# Patient Record
Sex: MALE | Race: WHITE | NOT HISPANIC OR LATINO | Employment: FULL TIME | RURAL
[De-identification: names, ages, dates, MRNs, and addresses within clinical notes are randomized per-mention and may not be internally consistent; named-entity substitution may affect disease eponyms.]

---

## 2017-06-15 ENCOUNTER — CONVERSION ENCOUNTER (OUTPATIENT)
Dept: FAMILY MEDICINE CLINIC | Facility: CLINIC | Age: 52
End: 2017-06-15

## 2017-06-16 LAB — TESTOST SERPL-MCNC: 369 NG/DL (ref 250–827)

## 2017-12-15 ENCOUNTER — CONVERSION ENCOUNTER (OUTPATIENT)
Dept: FAMILY MEDICINE CLINIC | Facility: CLINIC | Age: 52
End: 2017-12-15

## 2017-12-16 LAB — TESTOST SERPL-MCNC: 357 NG/DL (ref 250–827)

## 2019-02-12 ENCOUNTER — CONVERSION ENCOUNTER (OUTPATIENT)
Dept: FAMILY MEDICINE CLINIC | Facility: CLINIC | Age: 54
End: 2019-02-12

## 2019-02-15 LAB — 25(OH)D3 SERPL-MCNC: 41 NG/ML (ref 30–100)

## 2019-08-05 RX ORDER — SILDENAFIL CITRATE 20 MG/1
TABLET ORAL
Qty: 50 TABLET | OUTPATIENT
Start: 2019-08-05

## 2019-08-06 ENCOUNTER — CLINICAL SUPPORT (OUTPATIENT)
Dept: FAMILY MEDICINE CLINIC | Facility: CLINIC | Age: 54
End: 2019-08-06

## 2019-08-06 ENCOUNTER — TELEPHONE (OUTPATIENT)
Dept: FAMILY MEDICINE CLINIC | Facility: CLINIC | Age: 54
End: 2019-08-06

## 2019-08-06 DIAGNOSIS — R79.89 LOW TESTOSTERONE IN MALE: Primary | ICD-10-CM

## 2019-08-06 PROCEDURE — 96372 THER/PROPH/DIAG INJ SC/IM: CPT | Performed by: FAMILY MEDICINE

## 2019-08-06 RX ORDER — SILDENAFIL CITRATE 20 MG/1
TABLET ORAL
Qty: 50 TABLET | OUTPATIENT
Start: 2019-08-06

## 2019-08-06 RX ORDER — TESTOSTERONE CYPIONATE 200 MG/ML
200 INJECTION, SOLUTION INTRAMUSCULAR ONCE
Status: COMPLETED | OUTPATIENT
Start: 2019-08-06 | End: 2019-08-06

## 2019-08-06 RX ADMIN — TESTOSTERONE CYPIONATE 200 MG: 200 INJECTION, SOLUTION INTRAMUSCULAR at 17:06

## 2019-08-07 ENCOUNTER — TELEPHONE (OUTPATIENT)
Dept: FAMILY MEDICINE CLINIC | Facility: CLINIC | Age: 54
End: 2019-08-07

## 2019-08-07 RX ORDER — LISINOPRIL AND HYDROCHLOROTHIAZIDE 20; 12.5 MG/1; MG/1
1 TABLET ORAL DAILY
Refills: 4 | COMMUNITY
Start: 2019-07-06 | End: 2019-10-29

## 2019-08-07 RX ORDER — SILDENAFIL CITRATE 20 MG/1
20 TABLET ORAL DAILY
Qty: 50 TABLET | Refills: 3 | Status: SHIPPED | OUTPATIENT
Start: 2019-08-07 | End: 2019-08-07 | Stop reason: SDUPTHER

## 2019-08-07 RX ORDER — CHLORHEXIDINE GLUCONATE 0.12 MG/ML
RINSE ORAL
Refills: 1 | COMMUNITY
Start: 2019-07-06 | End: 2020-05-14

## 2019-08-07 RX ORDER — ANASTROZOLE 1 MG/1
TABLET ORAL
Refills: 3 | COMMUNITY
Start: 2019-07-06 | End: 2019-10-29

## 2019-08-08 RX ORDER — SILDENAFIL CITRATE 20 MG/1
TABLET ORAL
Qty: 50 TABLET | Refills: 3 | Status: SHIPPED | OUTPATIENT
Start: 2019-08-08 | End: 2019-10-29 | Stop reason: SDUPTHER

## 2019-10-29 ENCOUNTER — OFFICE VISIT (OUTPATIENT)
Dept: FAMILY MEDICINE CLINIC | Facility: CLINIC | Age: 54
End: 2019-10-29

## 2019-10-29 VITALS
BODY MASS INDEX: 31.22 KG/M2 | HEART RATE: 60 BPM | OXYGEN SATURATION: 98 % | WEIGHT: 223 LBS | TEMPERATURE: 97.8 F | DIASTOLIC BLOOD PRESSURE: 93 MMHG | RESPIRATION RATE: 16 BRPM | SYSTOLIC BLOOD PRESSURE: 151 MMHG | HEIGHT: 71 IN

## 2019-10-29 DIAGNOSIS — Z23 FLU VACCINE NEED: ICD-10-CM

## 2019-10-29 DIAGNOSIS — E29.1 HYPOTESTOSTERONEMIA IN MALE: ICD-10-CM

## 2019-10-29 DIAGNOSIS — Z12.5 SCREENING FOR PROSTATE CANCER: ICD-10-CM

## 2019-10-29 DIAGNOSIS — Z00.00 ENCOUNTER FOR WELLNESS EXAMINATION: Primary | ICD-10-CM

## 2019-10-29 DIAGNOSIS — I10 ESSENTIAL HYPERTENSION: ICD-10-CM

## 2019-10-29 PROCEDURE — 80061 LIPID PANEL: CPT | Performed by: FAMILY MEDICINE

## 2019-10-29 PROCEDURE — G0103 PSA SCREENING: HCPCS | Performed by: FAMILY MEDICINE

## 2019-10-29 PROCEDURE — 36415 COLL VENOUS BLD VENIPUNCTURE: CPT | Performed by: FAMILY MEDICINE

## 2019-10-29 PROCEDURE — 84402 ASSAY OF FREE TESTOSTERONE: CPT | Performed by: FAMILY MEDICINE

## 2019-10-29 PROCEDURE — 99386 PREV VISIT NEW AGE 40-64: CPT | Performed by: FAMILY MEDICINE

## 2019-10-29 PROCEDURE — 96372 THER/PROPH/DIAG INJ SC/IM: CPT | Performed by: FAMILY MEDICINE

## 2019-10-29 PROCEDURE — 99213 OFFICE O/P EST LOW 20 MIN: CPT | Performed by: FAMILY MEDICINE

## 2019-10-29 PROCEDURE — 90674 CCIIV4 VAC NO PRSV 0.5 ML IM: CPT | Performed by: FAMILY MEDICINE

## 2019-10-29 PROCEDURE — 80053 COMPREHEN METABOLIC PANEL: CPT | Performed by: FAMILY MEDICINE

## 2019-10-29 PROCEDURE — 90471 IMMUNIZATION ADMIN: CPT | Performed by: FAMILY MEDICINE

## 2019-10-29 PROCEDURE — 84403 ASSAY OF TOTAL TESTOSTERONE: CPT | Performed by: FAMILY MEDICINE

## 2019-10-29 RX ORDER — SILDENAFIL 100 MG/1
100 TABLET, FILM COATED ORAL DAILY PRN
Qty: 30 TABLET | Refills: 1 | Status: SHIPPED | OUTPATIENT
Start: 2019-10-29 | End: 2020-01-21

## 2019-10-29 RX ORDER — AMLODIPINE BESYLATE 5 MG/1
5 TABLET ORAL DAILY
Qty: 30 TABLET | Refills: 1 | Status: SHIPPED | OUTPATIENT
Start: 2019-10-29 | End: 2019-12-16 | Stop reason: SDUPTHER

## 2019-10-29 RX ORDER — TESTOSTERONE CYPIONATE 200 MG/ML
200 INJECTION, SOLUTION INTRAMUSCULAR ONCE
Status: COMPLETED | OUTPATIENT
Start: 2019-10-29 | End: 2019-10-29

## 2019-10-29 RX ORDER — SYRINGE W-NEEDLE,DISPOSAB,3 ML 25GX5/8"
1 SYRINGE, EMPTY DISPOSABLE MISCELLANEOUS
Qty: 24 EACH | Refills: 0 | Status: SHIPPED | OUTPATIENT
Start: 2019-10-29 | End: 2019-11-23 | Stop reason: SDUPTHER

## 2019-10-29 RX ORDER — TESTOSTERONE CYPIONATE 200 MG/ML
200 INJECTION, SOLUTION INTRAMUSCULAR
Qty: 6 ML | Refills: 1 | Status: SHIPPED | OUTPATIENT
Start: 2019-10-29 | End: 2019-10-29 | Stop reason: SDUPTHER

## 2019-10-29 RX ORDER — TESTOSTERONE CYPIONATE 200 MG/ML
200 INJECTION, SOLUTION INTRAMUSCULAR
COMMUNITY
End: 2019-10-29 | Stop reason: SDUPTHER

## 2019-10-29 RX ADMIN — TESTOSTERONE CYPIONATE 200 MG: 200 INJECTION, SOLUTION INTRAMUSCULAR at 11:50

## 2019-10-30 LAB
ALBUMIN SERPL-MCNC: 4.8 G/DL (ref 3.5–5.2)
ALBUMIN/GLOB SERPL: 1.5 G/DL
ALP SERPL-CCNC: 87 U/L (ref 39–117)
ALT SERPL W P-5'-P-CCNC: 44 U/L (ref 1–41)
ANION GAP SERPL CALCULATED.3IONS-SCNC: 12.1 MMOL/L (ref 5–15)
AST SERPL-CCNC: 29 U/L (ref 1–40)
BILIRUB SERPL-MCNC: 0.4 MG/DL (ref 0.2–1.2)
BUN BLD-MCNC: 16 MG/DL (ref 6–20)
BUN/CREAT SERPL: 15.4 (ref 7–25)
CALCIUM SPEC-SCNC: 9.3 MG/DL (ref 8.6–10.5)
CHLORIDE SERPL-SCNC: 95 MMOL/L (ref 98–107)
CHOLEST SERPL-MCNC: 198 MG/DL (ref 0–200)
CO2 SERPL-SCNC: 28.9 MMOL/L (ref 22–29)
CREAT BLD-MCNC: 1.04 MG/DL (ref 0.76–1.27)
GFR SERPL CREATININE-BSD FRML MDRD: 74 ML/MIN/1.73
GLOBULIN UR ELPH-MCNC: 3.1 GM/DL
GLUCOSE BLD-MCNC: 129 MG/DL (ref 65–99)
HDLC SERPL-MCNC: 45 MG/DL (ref 40–60)
LDLC SERPL CALC-MCNC: 137 MG/DL (ref 0–100)
LDLC/HDLC SERPL: 3.04 {RATIO}
POTASSIUM BLD-SCNC: 4 MMOL/L (ref 3.5–5.2)
PROT SERPL-MCNC: 7.9 G/DL (ref 6–8.5)
PSA SERPL-MCNC: 0.89 NG/ML (ref 0–4)
SODIUM BLD-SCNC: 136 MMOL/L (ref 136–145)
TRIGL SERPL-MCNC: 80 MG/DL (ref 0–150)
VLDLC SERPL-MCNC: 16 MG/DL (ref 5–40)

## 2019-11-02 LAB
TESTOST FREE SERPL-MCNC: 8.6 PG/ML (ref 7.2–24)
TESTOST SERPL-MCNC: 555.1 NG/DL (ref 264–916)

## 2019-11-04 DIAGNOSIS — R73.09 ELEVATED GLUCOSE: Primary | ICD-10-CM

## 2019-11-21 RX ORDER — TESTOSTERONE CYPIONATE 200 MG/ML
200 INJECTION, SOLUTION INTRAMUSCULAR
Start: 2019-11-21 | End: 2020-06-01

## 2019-11-25 RX ORDER — SYRINGE WITH NEEDLE, 1 ML 25GX5/8"
SYRINGE, EMPTY DISPOSABLE MISCELLANEOUS
Qty: 20 EACH | Refills: 0 | Status: SHIPPED | OUTPATIENT
Start: 2019-11-25 | End: 2021-12-17 | Stop reason: SDUPTHER

## 2019-11-26 ENCOUNTER — CLINICAL SUPPORT (OUTPATIENT)
Dept: FAMILY MEDICINE CLINIC | Facility: CLINIC | Age: 54
End: 2019-11-26

## 2019-11-26 DIAGNOSIS — R73.09 ELEVATED GLUCOSE: ICD-10-CM

## 2019-11-26 LAB — HBA1C MFR BLD: 5.7 %

## 2019-11-26 PROCEDURE — 83036 HEMOGLOBIN GLYCOSYLATED A1C: CPT | Performed by: FAMILY MEDICINE

## 2019-12-16 RX ORDER — AMLODIPINE BESYLATE 5 MG/1
TABLET ORAL
Qty: 30 TABLET | Refills: 0 | Status: SHIPPED | OUTPATIENT
Start: 2019-12-16 | End: 2020-01-15

## 2020-01-15 RX ORDER — AMLODIPINE BESYLATE 5 MG/1
TABLET ORAL
Qty: 90 TABLET | Refills: 0 | Status: SHIPPED | OUTPATIENT
Start: 2020-01-15 | End: 2020-04-02 | Stop reason: SDUPTHER

## 2020-01-15 NOTE — TELEPHONE ENCOUNTER
Last visit: 10/29/19  Next visit: none  Last labs: 10/29/19    Rx requested: amLODIPine  Pharmacy: [facility] in [place]

## 2020-01-20 NOTE — TELEPHONE ENCOUNTER
Last visit:  10/29/19  Next visit:none   Last labs:  11/26/19     Rx requested: sildenafil   Pharmacy: [facility] [place]

## 2020-01-21 RX ORDER — SILDENAFIL 100 MG/1
TABLET, FILM COATED ORAL
Qty: 30 TABLET | Refills: 1 | Status: SHIPPED | OUTPATIENT
Start: 2020-01-21 | End: 2021-02-22 | Stop reason: SDUPTHER

## 2020-03-20 ENCOUNTER — PATIENT MESSAGE (OUTPATIENT)
Dept: FAMILY MEDICINE CLINIC | Facility: CLINIC | Age: 55
End: 2020-03-20

## 2020-03-20 ENCOUNTER — TELEPHONE (OUTPATIENT)
Dept: FAMILY MEDICINE CLINIC | Facility: CLINIC | Age: 55
End: 2020-03-20

## 2020-03-20 NOTE — TELEPHONE ENCOUNTER
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 Please let me know if you need anything.     I have a [employer] license renewal physical coming due in May with our company nurse. As part of my application for renewal and physical form, I'm required to get a letter from my Doctor on they're company letterhead listing any medications and the verbiage listed saying it's working and won't effect job.     You can mail or I can swing by to pick it up anytime in the next few weeks. No hurry.     Thanks so much,   Jalil Khoury

## 2020-03-20 NOTE — TELEPHONE ENCOUNTER
Clarify meds and doses first w/ pt  He needs a letter from me saying that the meds/ doses he takes will not affect his job performance???   Monitoring Blood Pressure At Home  1. Rest for 5-15 minutes before taking blood pressure  2. Record 3 blood pressures, each 1-5 minutes apart  3. Average the 3 readings

## 2020-04-02 RX ORDER — AMLODIPINE BESYLATE 5 MG/1
7.5 TABLET ORAL DAILY
Qty: 135 TABLET | Refills: 0 | Status: SHIPPED | OUTPATIENT
Start: 2020-04-02 | End: 2020-04-10

## 2020-04-10 RX ORDER — AMLODIPINE BESYLATE 5 MG/1
TABLET ORAL
Qty: 30 TABLET | Refills: 0 | Status: SHIPPED | OUTPATIENT
Start: 2020-04-10 | End: 2020-05-05

## 2020-05-14 ENCOUNTER — OFFICE VISIT (OUTPATIENT)
Dept: FAMILY MEDICINE CLINIC | Facility: CLINIC | Age: 55
End: 2020-05-14

## 2020-05-14 VITALS
HEART RATE: 66 BPM | TEMPERATURE: 97.8 F | BODY MASS INDEX: 30.66 KG/M2 | DIASTOLIC BLOOD PRESSURE: 96 MMHG | SYSTOLIC BLOOD PRESSURE: 151 MMHG | OXYGEN SATURATION: 99 % | WEIGHT: 219 LBS | RESPIRATION RATE: 14 BRPM | HEIGHT: 71 IN

## 2020-05-14 DIAGNOSIS — E29.1 HYPOTESTOSTERONEMIA IN MALE: ICD-10-CM

## 2020-05-14 DIAGNOSIS — I10 UNCONTROLLED HYPERTENSION: Primary | ICD-10-CM

## 2020-05-14 PROBLEM — N52.9 ERECTILE DYSFUNCTION: Status: ACTIVE | Noted: 2020-05-14

## 2020-05-14 PROCEDURE — 84403 ASSAY OF TOTAL TESTOSTERONE: CPT | Performed by: FAMILY MEDICINE

## 2020-05-14 PROCEDURE — 84402 ASSAY OF FREE TESTOSTERONE: CPT | Performed by: FAMILY MEDICINE

## 2020-05-14 PROCEDURE — 36415 COLL VENOUS BLD VENIPUNCTURE: CPT | Performed by: FAMILY MEDICINE

## 2020-05-14 PROCEDURE — 99213 OFFICE O/P EST LOW 20 MIN: CPT | Performed by: FAMILY MEDICINE

## 2020-05-14 RX ORDER — AMLODIPINE BESYLATE 10 MG/1
10 TABLET ORAL DAILY
Qty: 90 TABLET | Refills: 0 | Status: SHIPPED | OUTPATIENT
Start: 2020-05-14 | End: 2020-05-14

## 2020-05-14 RX ORDER — AMLODIPINE BESYLATE 10 MG/1
10 TABLET ORAL DAILY
Qty: 90 TABLET | Refills: 0 | Status: SHIPPED | OUTPATIENT
Start: 2020-05-14 | End: 2020-09-02 | Stop reason: SDUPTHER

## 2020-05-14 RX ORDER — AMLODIPINE BESYLATE 5 MG/1
1.5 TABLET ORAL DAILY
COMMUNITY
End: 2020-05-14

## 2020-05-14 NOTE — PROGRESS NOTES
"Subjective   Turner Khoury is a 54 y.o. male.     Chief Complaint   Patient presents with   • Hypertension   • Hypogonadism         Current Outpatient Medications:   •  amLODIPine (NORVASC) 10 MG tablet, Take 1 tablet by mouth Daily., Disp: 90 tablet, Rfl: 0  •  B-D 3CC LUER-TONI SYR 23GX1\" 23G X 1\" 3 ML misc, USE AS DIRECTED EVERY 14 (FOURTEEN) DAYS, Disp: 20 each, Rfl: 0  •  sildenafil (VIAGRA) 100 MG tablet, TAKE ONE TABLET BY MOUTH DAILY AS NEEDED FOR ERECTILE DYSFUNCTION AS DIRECTED, Disp: 30 tablet, Rfl: 1  •  Testosterone Cypionate (DEPO-TESTOSTERONE) 200 MG/ML injection, Inject 1 mL into the appropriate muscle as directed by prescriber Every 14 (Fourteen) Days., Disp: , Rfl:     Past Medical History:   Diagnosis Date   • Colon polyp    • Erectile dysfunction    • Hypertension    • Hypogonadism in male        Past Surgical History:   Procedure Laterality Date   • COLONOSCOPY         Family History   Problem Relation Age of Onset   • Alcohol abuse Mother    • Hypertension Mother    • Stroke Mother    • Cancer Father         Lung Cancer       Social History     Socioeconomic History   • Marital status: Single     Spouse name: Not on file   • Number of children: Not on file   • Years of education: Not on file   • Highest education level: Not on file   Tobacco Use   • Smoking status: Former Smoker     Packs/day: 2.00     Years: 10.00     Pack years: 20.00     Types: Cigarettes     Last attempt to quit:      Years since quittin.3   • Smokeless tobacco: Never Used   Substance and Sexual Activity   • Alcohol use: Yes     Comment: occ   • Drug use: No   • Sexual activity: Yes     Partners: Female     Birth control/protection: Condom       53 y/o C male here for f/u on HTN/ Hypotestosteronism    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    Review of Systems   Constitutional: Negative for activity change, appetite change, fatigue, unexpected weight gain and unexpected weight loss.   Respiratory: Negative for cough, chest tightness and shortness of breath.    Cardiovascular: Negative for chest pain, palpitations and leg swelling.   Genitourinary: Negative for decreased libido, frequency, erectile dysfunction, urgency and urinary incontinence.   Musculoskeletal: Negative for arthralgias and myalgias.   Neurological: Negative for dizziness, facial asymmetry, speech difficulty, weakness, light-headedness, headache, memory problem and confusion.       Vitals:    05/14/20 0923   BP: 151/96   Pulse: 66   Resp: 14   Temp: 97.8 °F (36.6 °C)   SpO2: 99%       Objective   Physical Exam   Constitutional: He is oriented to person, place, and time. He appears well-developed and well-nourished.   HENT:   Head: Normocephalic and atraumatic.   Neck: Normal range of motion. Neck supple. Normal carotid pulses present. Carotid bruit is not present.   Cardiovascular: Normal rate, regular rhythm, normal heart sounds and intact distal pulses.   No murmur heard.  Pulmonary/Chest: Effort normal and breath sounds normal.   Musculoskeletal: He exhibits no edema.   Neurological: He is alert and oriented to person, place, and time. No cranial nerve deficit.   Skin: Skin is warm and dry. No rash noted.   Psychiatric: He has a normal mood and affect. His behavior is normal. Judgment and thought content normal.   Nursing note and vitals reviewed.        Assessment/Plan   Turner was seen today for hypertension and hypogonadism.    Diagnoses and all orders for this visit:    Uncontrolled hypertension    Hypotestosteronemia in male  -     Testosterone (Free & Total), LC / MS; Future  -      Testosterone (Free & Total), LC / MS    Other orders  -     Discontinue: amLODIPine (NORVASC) 10 MG tablet; Take 1 tablet by mouth Daily.  -     amLODIPine (NORVASC) 10 MG tablet; Take 1 tablet by mouth Daily.    Pt to cont to monitor o/s BP's and call if >140/90

## 2020-05-17 LAB
TESTOST FREE SERPL-MCNC: 18 PG/ML (ref 7.2–24)
TESTOST SERPL-MCNC: 966.6 NG/DL (ref 264–916)

## 2020-05-18 ENCOUNTER — PATIENT MESSAGE (OUTPATIENT)
Dept: FAMILY MEDICINE CLINIC | Facility: CLINIC | Age: 55
End: 2020-05-18

## 2020-05-30 DIAGNOSIS — E29.1 HYPOTESTOSTERONEMIA IN MALE: Primary | ICD-10-CM

## 2020-06-01 RX ORDER — TESTOSTERONE CYPIONATE 200 MG/ML
INJECTION, SOLUTION INTRAMUSCULAR
Qty: 2 ML | Refills: 2 | Status: SHIPPED | OUTPATIENT
Start: 2020-06-01 | End: 2020-09-04

## 2020-06-01 NOTE — TELEPHONE ENCOUNTER
Last visit:  5/14/20  Next visit: none  Last labs: 5/14/20    Rx requested: Testosterone   Pharmacy: [facility] in [place]

## 2020-08-31 DIAGNOSIS — Z12.5 SCREENING FOR PROSTATE CANCER: ICD-10-CM

## 2020-08-31 DIAGNOSIS — Z13.220 ENCOUNTER FOR SCREENING FOR LIPID DISORDER: ICD-10-CM

## 2020-08-31 DIAGNOSIS — E29.1 HYPOTESTOSTERONEMIA IN MALE: ICD-10-CM

## 2020-08-31 DIAGNOSIS — I10 UNCONTROLLED HYPERTENSION: ICD-10-CM

## 2020-08-31 DIAGNOSIS — E29.1 HYPOTESTOSTERONEMIA IN MALE: Primary | ICD-10-CM

## 2020-08-31 RX ORDER — TESTOSTERONE CYPIONATE 200 MG/ML
INJECTION, SOLUTION INTRAMUSCULAR
Refills: 1 | OUTPATIENT
Start: 2020-08-31

## 2020-08-31 NOTE — TELEPHONE ENCOUNTER
Last visit:  5/14/20  Next visit: none  Last labs:5/14/20    Rx requested: Testosterone   Pharmacy: [facility] in [place]

## 2020-09-02 ENCOUNTER — CLINICAL SUPPORT (OUTPATIENT)
Dept: FAMILY MEDICINE CLINIC | Facility: CLINIC | Age: 55
End: 2020-09-02

## 2020-09-02 VITALS — DIASTOLIC BLOOD PRESSURE: 84 MMHG | HEART RATE: 77 BPM | SYSTOLIC BLOOD PRESSURE: 128 MMHG

## 2020-09-02 DIAGNOSIS — I10 UNCONTROLLED HYPERTENSION: ICD-10-CM

## 2020-09-02 DIAGNOSIS — E29.1 HYPOTESTOSTERONEMIA IN MALE: ICD-10-CM

## 2020-09-02 DIAGNOSIS — Z13.220 ENCOUNTER FOR SCREENING FOR LIPID DISORDER: ICD-10-CM

## 2020-09-02 DIAGNOSIS — N52.9 ERECTILE DYSFUNCTION, UNSPECIFIED ERECTILE DYSFUNCTION TYPE: ICD-10-CM

## 2020-09-02 PROCEDURE — 36415 COLL VENOUS BLD VENIPUNCTURE: CPT | Performed by: FAMILY MEDICINE

## 2020-09-02 PROCEDURE — 80053 COMPREHEN METABOLIC PANEL: CPT | Performed by: FAMILY MEDICINE

## 2020-09-02 PROCEDURE — 80061 LIPID PANEL: CPT | Performed by: FAMILY MEDICINE

## 2020-09-02 PROCEDURE — 84402 ASSAY OF FREE TESTOSTERONE: CPT | Performed by: FAMILY MEDICINE

## 2020-09-02 PROCEDURE — 84403 ASSAY OF TOTAL TESTOSTERONE: CPT | Performed by: FAMILY MEDICINE

## 2020-09-02 RX ORDER — AMLODIPINE BESYLATE 10 MG/1
10 TABLET ORAL DAILY
Qty: 90 TABLET | Refills: 0 | Status: SHIPPED | OUTPATIENT
Start: 2020-09-02 | End: 2020-10-29 | Stop reason: SDUPTHER

## 2020-09-03 LAB
ALBUMIN SERPL-MCNC: 4.9 G/DL (ref 3.5–5.2)
ALBUMIN/GLOB SERPL: 1.8 G/DL
ALP SERPL-CCNC: 89 U/L (ref 39–117)
ALT SERPL W P-5'-P-CCNC: 31 U/L (ref 1–41)
ANION GAP SERPL CALCULATED.3IONS-SCNC: 13.3 MMOL/L (ref 5–15)
AST SERPL-CCNC: 20 U/L (ref 1–40)
BILIRUB SERPL-MCNC: 0.5 MG/DL (ref 0–1.2)
BUN SERPL-MCNC: 12 MG/DL (ref 6–20)
BUN/CREAT SERPL: 10.6 (ref 7–25)
CALCIUM SPEC-SCNC: 9.9 MG/DL (ref 8.6–10.5)
CHLORIDE SERPL-SCNC: 99 MMOL/L (ref 98–107)
CHOLEST SERPL-MCNC: 218 MG/DL (ref 0–200)
CO2 SERPL-SCNC: 29.7 MMOL/L (ref 22–29)
CREAT SERPL-MCNC: 1.13 MG/DL (ref 0.76–1.27)
GFR SERPL CREATININE-BSD FRML MDRD: 68 ML/MIN/1.73
GLOBULIN UR ELPH-MCNC: 2.8 GM/DL
GLUCOSE SERPL-MCNC: 114 MG/DL (ref 65–99)
HDLC SERPL-MCNC: 55 MG/DL (ref 40–60)
LDLC SERPL CALC-MCNC: 148 MG/DL (ref 0–100)
LDLC/HDLC SERPL: 2.7 {RATIO}
POTASSIUM SERPL-SCNC: 3.9 MMOL/L (ref 3.5–5.2)
PROT SERPL-MCNC: 7.7 G/DL (ref 6–8.5)
SODIUM SERPL-SCNC: 142 MMOL/L (ref 136–145)
TRIGL SERPL-MCNC: 73 MG/DL (ref 0–150)
VLDLC SERPL-MCNC: 14.6 MG/DL (ref 5–40)

## 2020-09-04 RX ORDER — TESTOSTERONE CYPIONATE 200 MG/ML
INJECTION, SOLUTION INTRAMUSCULAR
Qty: 2 ML | Refills: 0 | Status: SHIPPED | OUTPATIENT
Start: 2020-09-04 | End: 2020-09-18

## 2020-09-05 DIAGNOSIS — E29.1 HYPOTESTOSTERONEMIA IN MALE: ICD-10-CM

## 2020-09-05 LAB
TESTOST FREE SERPL-MCNC: 21.2 PG/ML (ref 7.2–24)
TESTOST SERPL-MCNC: 1476.7 NG/DL (ref 264–916)

## 2020-09-18 DIAGNOSIS — E29.1 HYPOTESTOSTERONEMIA IN MALE: ICD-10-CM

## 2020-09-18 RX ORDER — TESTOSTERONE CYPIONATE 200 MG/ML
INJECTION, SOLUTION INTRAMUSCULAR
Qty: 2 ML | Refills: 0 | Status: SHIPPED | OUTPATIENT
Start: 2020-09-18 | End: 2021-04-16

## 2020-09-18 NOTE — TELEPHONE ENCOUNTER
Last visit:  5/14/20  Next visit:none  Last labs:9/2/20    Rx requested: Testosterone   Pharmacy: [facility] in [place]

## 2020-10-29 ENCOUNTER — OFFICE VISIT (OUTPATIENT)
Dept: FAMILY MEDICINE CLINIC | Facility: CLINIC | Age: 55
End: 2020-10-29

## 2020-10-29 VITALS
BODY MASS INDEX: 30.94 KG/M2 | HEART RATE: 69 BPM | DIASTOLIC BLOOD PRESSURE: 90 MMHG | TEMPERATURE: 97.5 F | RESPIRATION RATE: 14 BRPM | WEIGHT: 221 LBS | SYSTOLIC BLOOD PRESSURE: 143 MMHG | OXYGEN SATURATION: 100 % | HEIGHT: 71 IN

## 2020-10-29 DIAGNOSIS — R03.0 ELEVATED BLOOD-PRESSURE READING, WITHOUT DIAGNOSIS OF HYPERTENSION: ICD-10-CM

## 2020-10-29 DIAGNOSIS — E78.2 MIXED HYPERLIPIDEMIA: ICD-10-CM

## 2020-10-29 DIAGNOSIS — R19.8 RUQ FULLNESS: ICD-10-CM

## 2020-10-29 DIAGNOSIS — Z00.00 ANNUAL PHYSICAL EXAM: Primary | ICD-10-CM

## 2020-10-29 DIAGNOSIS — Z12.5 SCREENING FOR PROSTATE CANCER: ICD-10-CM

## 2020-10-29 DIAGNOSIS — R73.09 ELEVATED GLUCOSE: ICD-10-CM

## 2020-10-29 PROBLEM — I10 HYPERTENSION: Status: RESOLVED | Noted: 2020-05-14 | Resolved: 2020-10-29

## 2020-10-29 LAB
BILIRUB BLD-MCNC: NEGATIVE MG/DL
CLARITY, POC: CLEAR
COLOR UR: YELLOW
GLUCOSE UR STRIP-MCNC: NEGATIVE MG/DL
HBA1C MFR BLD: 5.8 % (ref 3.5–5.6)
KETONES UR QL: NEGATIVE
LEUKOCYTE EST, POC: NEGATIVE
NITRITE UR-MCNC: NEGATIVE MG/ML
PH UR: 7 [PH] (ref 5–8)
PROT UR STRIP-MCNC: NEGATIVE MG/DL
RBC # UR STRIP: NEGATIVE /UL
SP GR UR: 1.01 (ref 1–1.03)
UROBILINOGEN UR QL: NORMAL

## 2020-10-29 PROCEDURE — 99396 PREV VISIT EST AGE 40-64: CPT | Performed by: FAMILY MEDICINE

## 2020-10-29 PROCEDURE — 83036 HEMOGLOBIN GLYCOSYLATED A1C: CPT | Performed by: FAMILY MEDICINE

## 2020-10-29 PROCEDURE — 36415 COLL VENOUS BLD VENIPUNCTURE: CPT | Performed by: FAMILY MEDICINE

## 2020-10-29 PROCEDURE — 81003 URINALYSIS AUTO W/O SCOPE: CPT | Performed by: FAMILY MEDICINE

## 2020-10-29 PROCEDURE — G0103 PSA SCREENING: HCPCS | Performed by: FAMILY MEDICINE

## 2020-10-29 RX ORDER — OMEGA-3 FATTY ACIDS CAP DELAYED RELEASE 1000 MG 1000 MG
1 CAPSULE DELAYED RELEASE ORAL DAILY
Start: 2020-10-29 | End: 2020-12-16

## 2020-10-29 RX ORDER — MULTIVIT-MIN/IRON/FOLIC ACID/K 18-600-40
2000 CAPSULE ORAL DAILY
Qty: 30 CAPSULE
Start: 2020-10-29 | End: 2020-12-16

## 2020-10-29 RX ORDER — PEPSIN
1 POWDER (GRAM) MISCELLANEOUS DAILY
Start: 2020-10-29 | End: 2021-12-17

## 2020-10-29 RX ORDER — LANOLIN ALCOHOL/MO/W.PET/CERES
1000 CREAM (GRAM) TOPICAL DAILY
Start: 2020-10-29 | End: 2020-12-16

## 2020-10-29 RX ORDER — AMLODIPINE BESYLATE 10 MG/1
10 TABLET ORAL DAILY
Qty: 90 TABLET | Refills: 1 | Status: SHIPPED | OUTPATIENT
Start: 2020-10-29 | End: 2020-12-16

## 2020-10-29 NOTE — PROGRESS NOTES
"[place]   Turner Khoury is a 55 y.o. male.     Chief Complaint   Patient presents with   • Annual Exam         Current Outpatient Medications:   •  amLODIPine (NORVASC) 10 MG tablet, Take 1 tablet by mouth Daily., Disp: 90 tablet, Rfl: 1  •  B-D 3CC LUER-TONI SYR 23GX1\" 23G X 1\" 3 ML misc, USE AS DIRECTED EVERY 14 (FOURTEEN) DAYS, Disp: 20 each, Rfl: 0  •  sildenafil (VIAGRA) 100 MG tablet, TAKE ONE TABLET BY MOUTH DAILY AS NEEDED FOR ERECTILE DYSFUNCTION AS DIRECTED, Disp: 30 tablet, Rfl: 1  •  Testosterone Cypionate (DEPOTESTOTERONE CYPIONATE) 200 MG/ML injection, INJECT 1 ML INTO THE APPROPRIATE MUSCLE AS DIRECTED BY PRESCRIBER EVERY 14 DAYS, Disp: 2 mL, Rfl: 0  •  Cholecalciferol (Vitamin D) 50 MCG (2000 UT) capsule, Take 1 capsule by mouth Daily., Disp: 30 capsule, Rfl:   •  Flaxseed, Linseed, (Flaxseed Oil) oil, Take 1 teaspoon(s) by mouth Daily., Disp: , Rfl:   •  Omega-3 Fatty Acids (Fish Oil) 1000 MG capsule delayed-release, Take 1 capsule by mouth Daily., Disp: , Rfl:   •  Oregano 1500 MG capsule, Take 1 capsule by mouth Daily., Disp: , Rfl:   •  Pepsin powder, 1 ampule Daily. GALLBLADDER FORMULA------1 po qday, Disp: , Rfl:   •  Prenatal Vit-Fe Fumarate-FA (M-Vit) tablet, Take 1 tablet by mouth Daily., Disp: 30 tablet, Rfl: 11  •  vitamin B-12 (CYANOCOBALAMIN) 1000 MCG tablet, Take 1 tablet by mouth Daily., Disp:  , Rfl:     Past Medical History:   Diagnosis Date   • Colon polyp    • Erectile dysfunction    • Hypertension    • Hypogonadism in male        Past Surgical History:   Procedure Laterality Date   • COLONOSCOPY      2016= TA, rech 2019       Family History   Problem Relation Age of Onset   • Alcohol abuse Mother    • Hypertension Mother    • Stroke Mother    • Cancer Father         Lung Cancer   • No Known Problems Sister        Social History     Socioeconomic History   • Marital status: Single     Spouse name: Not on file   • Number of children: Not on file   • Years of education: Not on " file   • Highest education level: Not on file   Tobacco Use   • Smoking status: Former Smoker     Packs/day: 2.00     Years: 10.00     Pack years: 20.00     Types: Cigarettes     Quit date:      Years since quittin.8   • Smokeless tobacco: Never Used   Substance and Sexual Activity   • Alcohol use: Yes     Comment: occ   • Drug use: No   • Sexual activity: Yes     Partners: Female     Birth control/protection: Condom       56 y/o C male here for annual PE    Pt states his home BP = 120's/80's and was checked for accuracy    Pt states he has been having issues w/ RUQ discomfort since he leaned up against a table or was lifting something-----states it swelled up and felt like a balloon; pt started taking an otc supplement for gallbladder and feels that has helped but still wanting to have his GB checked  Pt was concerned it could be due to his HRT so he stopped that about 1-2 mos ago    Admits he is Overdue for colonoscopy----states paperwork filled out but not sent in yet       The following portions of the patient's history were reviewed and updated as appropriate: allergies, current medications, past family history, past medical history, past social history, past surgical history and problem list.    Review of Systems   Constitutional: Negative for activity change, appetite change, fatigue, unexpected weight gain and unexpected weight loss.   HENT: Negative for congestion, ear pain, hearing loss, nosebleeds, postnasal drip, rhinorrhea, sinus pressure, sneezing, sore throat, tinnitus and trouble swallowing.    Eyes: Negative for blurred vision and double vision.   Respiratory: Negative for cough, shortness of breath and wheezing.    Cardiovascular: Negative for chest pain.   Gastrointestinal: Positive for abdominal distention. Negative for abdominal pain, constipation, diarrhea, nausea, vomiting, GERD and indigestion.   Genitourinary: Negative for difficulty urinating, dysuria, flank pain, frequency,  testicular pain, urgency and urinary incontinence.   Musculoskeletal: Negative for arthralgias and myalgias.   Skin: Negative for rash and skin lesions.   Neurological: Negative for weakness, memory problem and confusion.   Psychiatric/Behavioral: Negative for agitation, self-injury, suicidal ideas, depressed mood and stress. The patient is not nervous/anxious.        Vitals:    10/29/20 1136   BP: 143/90   Pulse: 69   Resp:    Temp:    SpO2:        Objective   Physical Exam  Vitals signs and nursing note reviewed.   Constitutional:       General: He is not in acute distress.     Appearance: Normal appearance. He is well-developed. He is not ill-appearing, toxic-appearing or diaphoretic.   HENT:      Head: Normocephalic and atraumatic.      Right Ear: Tympanic membrane, ear canal and external ear normal. There is no impacted cerumen.      Left Ear: Tympanic membrane, ear canal and external ear normal. There is no impacted cerumen.      Nose: Nose normal. No congestion or rhinorrhea.      Mouth/Throat:      Mouth: Mucous membranes are moist.      Pharynx: Oropharynx is clear. No oropharyngeal exudate or posterior oropharyngeal erythema.   Eyes:      General: No scleral icterus.        Right eye: No discharge.         Left eye: No discharge.      Extraocular Movements: Extraocular movements intact.      Conjunctiva/sclera: Conjunctivae normal.      Pupils: Pupils are equal, round, and reactive to light.   Neck:      Musculoskeletal: Normal range of motion and neck supple.      Thyroid: No thyromegaly.      Vascular: No carotid bruit.   Cardiovascular:      Rate and Rhythm: Normal rate and regular rhythm.      Heart sounds: Normal heart sounds. No murmur.   Pulmonary:      Effort: Pulmonary effort is normal. No respiratory distress.      Breath sounds: Normal breath sounds. No stridor. No wheezing or rales.   Abdominal:      General: Bowel sounds are normal. There is no distension.      Palpations: Abdomen is soft.  There is no mass.      Tenderness: There is no abdominal tenderness. There is no guarding or rebound.      Hernia: No hernia is present. There is no hernia in the left inguinal area or right inguinal area.   Genitourinary:     Penis: Normal and circumcised. No erythema or discharge.       Scrotum/Testes: Normal.         Right: Mass, tenderness or swelling not present.         Left: Mass, tenderness or swelling not present.      Prostate: Enlarged. No nodules present.      Rectum: Normal.   Musculoskeletal: Normal range of motion.      Right lower leg: No edema.      Left lower leg: No edema.   Lymphadenopathy:      Cervical: No cervical adenopathy.      Lower Body: No right inguinal adenopathy. No left inguinal adenopathy.   Skin:     General: Skin is warm and dry.      Capillary Refill: Capillary refill takes less than 2 seconds.      Findings: No erythema or rash.   Neurological:      General: No focal deficit present.      Mental Status: He is alert and oriented to person, place, and time. Mental status is at baseline.      Cranial Nerves: No cranial nerve deficit.      Motor: No abnormal muscle tone.      Deep Tendon Reflexes: Reflexes normal.   Psychiatric:         Mood and Affect: Mood normal.         Behavior: Behavior normal.         Thought Content: Thought content normal.         Judgment: Judgment normal.           Assessment/Plan   Diagnoses and all orders for this visit:    1. Annual physical exam (Primary)  -     POC Urinalysis Dipstick, Automated    2. Elevated blood-pressure reading, without diagnosis of hypertension    3. RUQ fullness  -     US Gallbladder; Future    4. Elevated glucose  -     Hemoglobin A1c    5. Mixed hyperlipidemia    6. Screening for prostate cancer  -     PSA Screen    Other orders  -     amLODIPine (NORVASC) 10 MG tablet; Take 1 tablet by mouth Daily.  Dispense: 90 tablet; Refill: 1  -     Pepsin powder; 1 ampule Daily. GALLBLADDER FORMULA------1 po qday  -     Flaxseed,  Linseed, (Flaxseed Oil) oil; Take 1 teaspoon(s) by mouth Daily.  -     Oregano 1500 MG capsule; Take 1 capsule by mouth Daily.  -     Omega-3 Fatty Acids (Fish Oil) 1000 MG capsule delayed-release; Take 1 capsule by mouth Daily.  -     Cholecalciferol (Vitamin D) 50 MCG (2000 UT) capsule; Take 1 capsule by mouth Daily.  Dispense: 30 capsule  -     vitamin B-12 (CYANOCOBALAMIN) 1000 MCG tablet; Take 1 tablet by mouth Daily.  Dispense:    -     Discontinue: Prenatal Vit-Fe Fumarate-FA (M-Vit) tablet; Take 1 tablet by mouth Daily.  Dispense: 30 tablet; Refill: 11  -     Prenatal Vit-Fe Fumarate-FA (M-Vit) tablet; Take 1 tablet by mouth Daily.  Dispense: 30 tablet; Refill: 11

## 2020-10-30 LAB — PSA SERPL-MCNC: 0.73 NG/ML (ref 0–4)

## 2020-11-23 ENCOUNTER — HOSPITAL ENCOUNTER (OUTPATIENT)
Dept: ULTRASOUND IMAGING | Facility: HOSPITAL | Age: 55
Discharge: HOME | End: 2020-11-23
Admitting: FAMILY MEDICINE

## 2020-11-23 DIAGNOSIS — R19.8 RUQ FULLNESS: ICD-10-CM

## 2020-11-23 PROCEDURE — 76705 ECHO EXAM OF ABDOMEN: CPT

## 2020-12-16 RX ORDER — AMLODIPINE BESYLATE 10 MG/1
TABLET ORAL
Qty: 90 TABLET | Refills: 0 | Status: SHIPPED | OUTPATIENT
Start: 2020-12-16 | End: 2021-07-06

## 2021-02-22 RX ORDER — SILDENAFIL 100 MG/1
TABLET, FILM COATED ORAL
Qty: 8 TABLET | Refills: 0 | OUTPATIENT
Start: 2021-02-22

## 2021-02-22 RX ORDER — SILDENAFIL 100 MG/1
100 TABLET, FILM COATED ORAL AS NEEDED
Qty: 30 TABLET | Refills: 1 | Status: SHIPPED | OUTPATIENT
Start: 2021-02-22 | End: 2021-02-23 | Stop reason: SDUPTHER

## 2021-02-22 NOTE — TELEPHONE ENCOUNTER
Last visit:  10/29/20  Next visit: none  Last labs: 10/29/20    Rx requested: Viagra  Pharmacy: [facility] in [place]

## 2021-02-23 ENCOUNTER — TELEPHONE (OUTPATIENT)
Dept: FAMILY MEDICINE CLINIC | Facility: CLINIC | Age: 56
End: 2021-02-23

## 2021-02-23 RX ORDER — SILDENAFIL 100 MG/1
100 TABLET, FILM COATED ORAL AS NEEDED
Qty: 30 TABLET | Refills: 1 | Status: SHIPPED | OUTPATIENT
Start: 2021-02-23 | End: 2021-07-06

## 2021-02-23 NOTE — TELEPHONE ENCOUNTER
----- Message from Sisi London DO sent at 2/23/2021 10:28 AM EST -----  Regarding: FW: Prescription Question  Contact: [phone]      ----- Message -----  From: Mar Butler RT  Sent: 2/23/2021   9:03 AM EST  To: Sisi London DO  Subject: FW: Prescription Question                          ----- Message -----  From: Turner Khoury  Sent: 2/23/2021   9:00 AM EST  To: [facility]  Subject: Prescription Question                            Good morning, I was curious why the sildenafil was denied? Do I need to schedule a check up?    Thanks  Jalil

## 2021-02-23 NOTE — TELEPHONE ENCOUNTER
sildenafil (VIAGRA) 100 MG tablet [Pharmacy Med Name: SILDENAFIL 100 MG TABLET]  2/22/2021 11:28 AM     Visit with authorizing provider in past 9 months or upcoming 90 days     Absence of nitrates on med list  Refused by Sisi London DO on 2/22/2021 8:44 PM

## 2021-04-15 DIAGNOSIS — E29.1 HYPOTESTOSTERONEMIA IN MALE: ICD-10-CM

## 2021-04-16 RX ORDER — TESTOSTERONE CYPIONATE 200 MG/ML
INJECTION, SOLUTION INTRAMUSCULAR
Qty: 2 ML | Refills: 0 | Status: SHIPPED | OUTPATIENT
Start: 2021-04-16 | End: 2021-05-10

## 2021-04-16 NOTE — TELEPHONE ENCOUNTER
Authorized by: Laura Ashraf MD     Non-formulary For: Hypotestosteronemia in male    To pharmacy: Needs appt

## 2021-05-08 DIAGNOSIS — E29.1 HYPOTESTOSTERONEMIA IN MALE: ICD-10-CM

## 2021-05-10 RX ORDER — TESTOSTERONE CYPIONATE 200 MG/ML
INJECTION, SOLUTION INTRAMUSCULAR
Qty: 2 ML | Refills: 1 | Status: SHIPPED | OUTPATIENT
Start: 2021-05-10 | End: 2021-08-30

## 2021-05-10 NOTE — TELEPHONE ENCOUNTER
Last visit:  10/29/20  Next visit: none  Last labs: 10/29/20    Rx requested: Testosterone   Pharmacy: [facility] in [place]

## 2021-07-06 DIAGNOSIS — Z12.5 SCREENING FOR PROSTATE CANCER: ICD-10-CM

## 2021-07-06 DIAGNOSIS — E78.2 MIXED HYPERLIPIDEMIA: ICD-10-CM

## 2021-07-06 DIAGNOSIS — R73.09 ELEVATED GLUCOSE: Primary | ICD-10-CM

## 2021-07-06 RX ORDER — AMLODIPINE BESYLATE 10 MG/1
TABLET ORAL
Qty: 90 TABLET | Refills: 0 | Status: SHIPPED | OUTPATIENT
Start: 2021-07-06 | End: 2021-10-25

## 2021-07-06 RX ORDER — SILDENAFIL 100 MG/1
100 TABLET, FILM COATED ORAL AS NEEDED
Qty: 90 TABLET | Refills: 0 | Status: SHIPPED | OUTPATIENT
Start: 2021-07-06 | End: 2021-10-25

## 2021-08-29 DIAGNOSIS — E29.1 HYPOTESTOSTERONEMIA IN MALE: ICD-10-CM

## 2021-08-30 RX ORDER — TESTOSTERONE CYPIONATE 200 MG/ML
INJECTION, SOLUTION INTRAMUSCULAR
Qty: 2 ML | Refills: 1 | Status: SHIPPED | OUTPATIENT
Start: 2021-08-30 | End: 2021-10-25

## 2021-10-25 ENCOUNTER — TELEPHONE (OUTPATIENT)
Dept: FAMILY MEDICINE CLINIC | Facility: CLINIC | Age: 56
End: 2021-10-25

## 2021-10-25 DIAGNOSIS — E29.1 HYPOTESTOSTERONEMIA IN MALE: ICD-10-CM

## 2021-10-25 RX ORDER — TESTOSTERONE CYPIONATE 200 MG/ML
INJECTION, SOLUTION INTRAMUSCULAR
Qty: 6 ML | Refills: 0 | Status: SHIPPED | OUTPATIENT
Start: 2021-10-25 | End: 2021-12-15 | Stop reason: SDUPTHER

## 2021-10-25 RX ORDER — SILDENAFIL 100 MG/1
100 TABLET, FILM COATED ORAL AS NEEDED
Qty: 30 TABLET | Refills: 0 | Status: SHIPPED | OUTPATIENT
Start: 2021-10-25 | End: 2021-11-22 | Stop reason: SDUPTHER

## 2021-10-25 RX ORDER — AMLODIPINE BESYLATE 10 MG/1
TABLET ORAL
Qty: 90 TABLET | Refills: 0 | Status: SHIPPED | OUTPATIENT
Start: 2021-10-25 | End: 2021-12-15 | Stop reason: SDUPTHER

## 2021-10-25 NOTE — TELEPHONE ENCOUNTER
Rx Refill Note  Requested Prescriptions     Pending Prescriptions Disp Refills   • amLODIPine (NORVASC) 10 MG tablet [Pharmacy Med Name: amLODIPine BESYLATE 10 MG TAB] 30 tablet      Sig: TAKE ONE TABLET BY MOUTH DAILY   • sildenafil (VIAGRA) 100 MG tablet [Pharmacy Med Name: SILDENAFIL 100 MG TABLET] 30 tablet      Sig: TAKE 1 TABLET BY MOUTH AS NEEDED FOR ERECTILE DYSFUNCTION   • Testosterone Cypionate (DEPOTESTOTERONE CYPIONATE) 200 MG/ML injection [Pharmacy Med Name: TESTOSTERONE  MG/ML SDV] 2 mL      Sig: INJECT 1ML INTRAMUSCULARLY EVERY 14 DAYS AS DIRECECTED      Last office visit with prescribing clinician: 10/29/2020      Next office visit with prescribing clinician: Visit date not found     Hemoglobin A1c (10/29/2020 11:20)  PSA Screen (10/29/2020 11:20)  Comprehensive Metabolic Panel (09/02/2020 10:55)  Lipid Panel (09/02/2020 10:55)         Anjelica Costello CMA  10/25/21, 14:08 EDT

## 2021-10-25 NOTE — TELEPHONE ENCOUNTER
Rx Refill Note  Requested Prescriptions     Pending Prescriptions Disp Refills   • amLODIPine (NORVASC) 10 MG tablet [Pharmacy Med Name: amLODIPine BESYLATE 10 MG TAB] 30 tablet      Sig: TAKE ONE TABLET BY MOUTH DAILY   • sildenafil (VIAGRA) 100 MG tablet [Pharmacy Med Name: SILDENAFIL 100 MG TABLET] 30 tablet      Sig: TAKE 1 TABLET BY MOUTH AS NEEDED FOR ERECTILE DYSFUNCTION   • Testosterone Cypionate (DEPOTESTOTERONE CYPIONATE) 200 MG/ML injection [Pharmacy Med Name: TESTOSTERONE  MG/ML SDV] 2 mL      Sig: INJECT 1ML INTRAMUSCULARLY EVERY 14 DAYS AS DIRECECTED      Last office visit with prescribing clinician: 10/29/2020      Next office visit with prescribing clinician: Visit date not found     Hemoglobin A1c (10/29/2020 11:20)  Comprehensive Metabolic Panel (09/02/2020 10:55)  Lipid Panel (09/02/2020 10:55)         Anjelica Costello CMA  10/25/21, 13:58 EDT

## 2021-10-25 NOTE — TELEPHONE ENCOUNTER
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     WANTING UPDATE ON PRESCRIPTIONS. [phone]

## 2021-10-25 NOTE — TELEPHONE ENCOUNTER
Caller: Turner Khoury    Relationship: Self    Requested Prescriptions:   Requested Prescriptions     Pending Prescriptions Disp Refills   • amLODIPine (NORVASC) 10 MG tablet [Pharmacy Med Name: amLODIPine BESYLATE 10 MG TAB] 30 tablet      Sig: TAKE ONE TABLET BY MOUTH DAILY   • sildenafil (VIAGRA) 100 MG tablet [Pharmacy Med Name: SILDENAFIL 100 MG TABLET] 30 tablet      Sig: TAKE 1 TABLET BY MOUTH AS NEEDED FOR ERECTILE DYSFUNCTION   • Testosterone Cypionate (DEPOTESTOTERONE CYPIONATE) 200 MG/ML injection [Pharmacy Med Name: TESTOSTERONE  MG/ML SDV] 2 mL      Sig: INJECT 1ML INTRAMUSCULARLY EVERY 14 DAYS AS DIRECECTED        [facility] [address] [place]  [address] [facility] - [phone]  - [phone]         Additional details provided by patient: PATIENT IS LEAVING FOR WORK LATER TODAY, LAST MINUTE. PATIENT NEEDS REFILLS BEFORE HE LEAVES OUT.     Best call back number: [phone]    Does the patient have less than a 3 day supply:  [x] Yes  [] No    Ignacio Nathan Rep   10/25/21 13:16 EDT

## 2021-10-25 NOTE — TELEPHONE ENCOUNTER
HUB to read.    PA was approved for testosterone.    CaseId:[id number];Status:Approved;Review Type:Prior Auth;Coverage Start Date:09/25/2021;Coverage End Date:10/25/2022;

## 2021-10-26 NOTE — TELEPHONE ENCOUNTER
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  Says he is going to take bp meds every other day until he returns to see you and get labs drawn.  He states when he is here it is usually just one day and he tries to get it all done on that day.  He will call to schedule everything when he looks at his schedule and see when he returns home.

## 2021-10-28 ENCOUNTER — TELEPHONE (OUTPATIENT)
Dept: FAMILY MEDICINE CLINIC | Facility: CLINIC | Age: 56
End: 2021-10-28

## 2021-10-28 NOTE — TELEPHONE ENCOUNTER
Caller: Turenr Khoury    Relationship to patient: Self    Best call back number: [phone]     Chief complaint: PHYSICAL    Type of visit: PHYSICAL    Requested date: 11/24/21    If rescheduling, when is the original appointment:     Additional notes:PATIENT IS A  HE IS ONLY AVAILABLE CERTAIN DAYS THERE WAS NO AVAILABLE DAYS HE COULD SCHEDULE THAT HE WAS ASKING FOR.

## 2021-11-02 ENCOUNTER — TELEPHONE (OUTPATIENT)
Dept: FAMILY MEDICINE CLINIC | Facility: CLINIC | Age: 56
End: 2021-11-02

## 2021-11-02 NOTE — TELEPHONE ENCOUNTER
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 HE HAS A FORM THAT NEEDS TO BE FILLED FOR HIS PLACE OF WORK AND WOULD LIKE TO KNOW IF DO STROOT WOULD BE ABLE TO GET HIM IN AT ANY TIME THAT DATE TO FILL OUT THE WELLNESS PAPERWORK.

## 2021-11-22 RX ORDER — SILDENAFIL 100 MG/1
100 TABLET, FILM COATED ORAL AS NEEDED
Qty: 30 TABLET | Refills: 0 | Status: SHIPPED | OUTPATIENT
Start: 2021-11-22 | End: 2021-12-15 | Stop reason: SDUPTHER

## 2021-12-15 DIAGNOSIS — E29.1 HYPOTESTOSTERONEMIA IN MALE: ICD-10-CM

## 2021-12-15 NOTE — TELEPHONE ENCOUNTER
Caller: Turner Khoury    Relationship: Self    Best call back number: [phone]    Requested Prescriptions:   Requested Prescriptions     Pending Prescriptions Disp Refills   • amLODIPine (NORVASC) 10 MG tablet 90 tablet 0     Sig: Take 1 tablet by mouth Daily.   • Testosterone Cypionate (DEPOTESTOTERONE CYPIONATE) 200 MG/ML injection 6 mL 0   • sildenafil (VIAGRA) 100 MG tablet 30 tablet 0     Sig: Take 1 tablet by mouth As Needed for Erectile Dysfunction.        Pharmacy where request should be sent: [facility] [address] [place] [address] AT [facility] - [phone]  - [phone]      Additional details provided by patient: REFILL REQUEST    Does the patient have less than a 3 day supply:  [] Yes  [x] No    Felisa Hendrix   12/15/21 11:12 EST

## 2021-12-16 RX ORDER — AMLODIPINE BESYLATE 10 MG/1
10 TABLET ORAL DAILY
Qty: 90 TABLET | Refills: 0 | Status: SHIPPED | OUTPATIENT
Start: 2021-12-16 | End: 2022-03-14 | Stop reason: SDUPTHER

## 2021-12-16 RX ORDER — TESTOSTERONE CYPIONATE 200 MG/ML
200 INJECTION, SOLUTION INTRAMUSCULAR
Qty: 6 ML | Refills: 0 | Status: SHIPPED | OUTPATIENT
Start: 2021-12-16 | End: 2022-05-11

## 2021-12-16 RX ORDER — SILDENAFIL 100 MG/1
100 TABLET, FILM COATED ORAL AS NEEDED
Qty: 30 TABLET | Refills: 0 | Status: SHIPPED | OUTPATIENT
Start: 2021-12-16 | End: 2022-02-11

## 2021-12-16 NOTE — TELEPHONE ENCOUNTER
He says the pharmacy takes days to fill it and he has to leave.  He was trying to get everything before he left

## 2021-12-17 ENCOUNTER — TELEPHONE (OUTPATIENT)
Dept: FAMILY MEDICINE CLINIC | Facility: CLINIC | Age: 56
End: 2021-12-17

## 2021-12-17 ENCOUNTER — OFFICE VISIT (OUTPATIENT)
Dept: FAMILY MEDICINE CLINIC | Facility: CLINIC | Age: 56
End: 2021-12-17

## 2021-12-17 VITALS
OXYGEN SATURATION: 100 % | WEIGHT: 226 LBS | HEIGHT: 71 IN | SYSTOLIC BLOOD PRESSURE: 128 MMHG | HEART RATE: 74 BPM | BODY MASS INDEX: 31.64 KG/M2 | TEMPERATURE: 97.3 F | DIASTOLIC BLOOD PRESSURE: 74 MMHG | RESPIRATION RATE: 16 BRPM

## 2021-12-17 DIAGNOSIS — B35.3 TINEA PEDIS OF BOTH FEET: ICD-10-CM

## 2021-12-17 DIAGNOSIS — Z00.00 ANNUAL PHYSICAL EXAM: Primary | ICD-10-CM

## 2021-12-17 DIAGNOSIS — I10 PRIMARY HYPERTENSION: ICD-10-CM

## 2021-12-17 DIAGNOSIS — R73.09 ELEVATED GLUCOSE: ICD-10-CM

## 2021-12-17 DIAGNOSIS — B35.1 ONYCHOMYCOSIS: ICD-10-CM

## 2021-12-17 DIAGNOSIS — Z12.5 SCREENING FOR PROSTATE CANCER: ICD-10-CM

## 2021-12-17 DIAGNOSIS — E78.2 MIXED HYPERLIPIDEMIA: ICD-10-CM

## 2021-12-17 LAB
BILIRUB BLD-MCNC: NEGATIVE MG/DL
CLARITY, POC: CLEAR
COLOR UR: YELLOW
EXPIRATION DATE: NORMAL
GLUCOSE UR STRIP-MCNC: NEGATIVE MG/DL
HBA1C MFR BLD: 5.7 % (ref 3.5–5.6)
KETONES UR QL: NEGATIVE
LEUKOCYTE EST, POC: NEGATIVE
Lab: NORMAL
NITRITE UR-MCNC: NEGATIVE MG/ML
PH UR: 7 [PH] (ref 5–8)
PROT UR STRIP-MCNC: NEGATIVE MG/DL
RBC # UR STRIP: NEGATIVE /UL
SP GR UR: 1.01 (ref 1–1.03)
UROBILINOGEN UR QL: NORMAL

## 2021-12-17 PROCEDURE — 36415 COLL VENOUS BLD VENIPUNCTURE: CPT | Performed by: FAMILY MEDICINE

## 2021-12-17 PROCEDURE — 81003 URINALYSIS AUTO W/O SCOPE: CPT | Performed by: FAMILY MEDICINE

## 2021-12-17 PROCEDURE — 80053 COMPREHEN METABOLIC PANEL: CPT | Performed by: FAMILY MEDICINE

## 2021-12-17 PROCEDURE — 99396 PREV VISIT EST AGE 40-64: CPT | Performed by: FAMILY MEDICINE

## 2021-12-17 PROCEDURE — G0103 PSA SCREENING: HCPCS | Performed by: FAMILY MEDICINE

## 2021-12-17 PROCEDURE — 83036 HEMOGLOBIN GLYCOSYLATED A1C: CPT | Performed by: FAMILY MEDICINE

## 2021-12-17 PROCEDURE — 80061 LIPID PANEL: CPT | Performed by: FAMILY MEDICINE

## 2021-12-17 RX ORDER — SYRINGE WITH NEEDLE, 1 ML 25GX5/8"
SYRINGE, EMPTY DISPOSABLE MISCELLANEOUS
Qty: 20 EACH | Refills: 0 | Status: CANCELLED | OUTPATIENT
Start: 2021-12-17

## 2021-12-17 RX ORDER — TERBINAFINE HYDROCHLORIDE 250 MG/1
TABLET ORAL
Qty: 28 TABLET | Refills: 0 | Status: SHIPPED | OUTPATIENT
Start: 2021-12-17 | End: 2022-04-11

## 2021-12-17 RX ORDER — SYRINGE WITH NEEDLE, 1 ML 25GX5/8"
1 SYRINGE, EMPTY DISPOSABLE MISCELLANEOUS
Qty: 20 EACH | Refills: 0 | Status: SHIPPED | OUTPATIENT
Start: 2021-12-17 | End: 2022-04-21 | Stop reason: SDUPTHER

## 2021-12-17 NOTE — TELEPHONE ENCOUNTER
"Caller: Turner Khoury    Relationship: Self    Best call back number: [phone]    Requested Prescriptions:   Requested Prescriptions     Pending Prescriptions Disp Refills   • Syringe/Needle, Disp, (B-D 3CC LUER-TONI SYR 23GX1\") 23G X 1\" 3 ML misc 20 each 0        Pharmacy where request should be sent: [facility] [address] - [phone]  - [phone]     Additional details provided by patient: PATIENT IS COMPLETELY OUT OF THESE SUPPLIES.    Does the patient have less than a 3 day supply:  [x] Yes  [] No    Ignacio Diamond Rep   12/17/21 14:01 EST         "

## 2021-12-18 LAB
ALBUMIN SERPL-MCNC: 4.7 G/DL (ref 3.5–5.2)
ALBUMIN/GLOB SERPL: 1.5 G/DL
ALP SERPL-CCNC: 82 U/L (ref 39–117)
ALT SERPL W P-5'-P-CCNC: 30 U/L (ref 1–41)
ANION GAP SERPL CALCULATED.3IONS-SCNC: 12.4 MMOL/L (ref 5–15)
AST SERPL-CCNC: 26 U/L (ref 1–40)
BILIRUB SERPL-MCNC: 0.5 MG/DL (ref 0–1.2)
BUN SERPL-MCNC: 10 MG/DL (ref 6–20)
BUN/CREAT SERPL: 11 (ref 7–25)
CALCIUM SPEC-SCNC: 9.7 MG/DL (ref 8.6–10.5)
CHLORIDE SERPL-SCNC: 99 MMOL/L (ref 98–107)
CHOLEST SERPL-MCNC: 179 MG/DL (ref 0–200)
CO2 SERPL-SCNC: 25.6 MMOL/L (ref 22–29)
CREAT SERPL-MCNC: 0.91 MG/DL (ref 0.76–1.27)
GFR SERPL CREATININE-BSD FRML MDRD: 86 ML/MIN/1.73
GLOBULIN UR ELPH-MCNC: 3.1 GM/DL
GLUCOSE SERPL-MCNC: 97 MG/DL (ref 65–99)
HDLC SERPL-MCNC: 50 MG/DL (ref 40–60)
LDLC SERPL CALC-MCNC: 116 MG/DL (ref 0–100)
LDLC/HDLC SERPL: 2.3 {RATIO}
POTASSIUM SERPL-SCNC: 4.2 MMOL/L (ref 3.5–5.2)
PROT SERPL-MCNC: 7.8 G/DL (ref 6–8.5)
PSA SERPL-MCNC: 1.25 NG/ML (ref 0–4)
SODIUM SERPL-SCNC: 137 MMOL/L (ref 136–145)
TRIGL SERPL-MCNC: 70 MG/DL (ref 0–150)
VLDLC SERPL-MCNC: 13 MG/DL (ref 5–40)

## 2021-12-27 ENCOUNTER — TELEPHONE (OUTPATIENT)
Dept: FAMILY MEDICINE CLINIC | Facility: CLINIC | Age: 56
End: 2021-12-27

## 2021-12-27 NOTE — TELEPHONE ENCOUNTER
Caller: Turner Khoury    Relationship to patient: Self    Best call back number: [phone]    Type of visit: LABS ONLY     Additional notes: PATIENT NEEDS 6 MONTH FOLLOW UP LABS TO RECHECK A1C PER DR. HORVATH. ATTEMPTED TO WARM TRANSFER TO  TO SCHEDULE BUT WAS UNSUCCESSFUL.    PLEASE CALL PATIENT TO SCHEDULE

## 2021-12-27 NOTE — TELEPHONE ENCOUNTER
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 HE WOULD LIKE A FOLLOW UP PHONE CALL WHEN PAPERWORK HAS BEEN COMPLETED AND FAXED.    Do you require a callback: YES

## 2022-02-11 RX ORDER — SILDENAFIL 100 MG/1
100 TABLET, FILM COATED ORAL AS NEEDED
Qty: 30 TABLET | Refills: 0 | Status: SHIPPED | OUTPATIENT
Start: 2022-02-11 | End: 2022-03-14 | Stop reason: SDUPTHER

## 2022-02-11 NOTE — TELEPHONE ENCOUNTER
Rx Refill Note  Requested Prescriptions     Pending Prescriptions Disp Refills   • sildenafil (VIAGRA) 100 MG tablet [Pharmacy Med Name: SILDENAFIL 100 MG TABLET] 30 tablet 0     Sig: TAKE 1 TABLET BY MOUTH AS NEEDED FOR ERECTILE DYSFUNCTION.      Last office visit with prescribing clinician: 12/17/2021      Next office visit with prescribing clinician: Visit date not found     Hemoglobin A1c (12/17/2021 16:23)  Comprehensive Metabolic Panel (12/17/2021 16:23)  Lipid Panel (12/17/2021 16:23)         Anjelica Costello CMA  02/11/22, 16:33 EST

## 2022-03-14 DIAGNOSIS — E29.1 HYPOGONADISM IN MALE: Primary | ICD-10-CM

## 2022-03-14 RX ORDER — SILDENAFIL 100 MG/1
100 TABLET, FILM COATED ORAL AS NEEDED
Qty: 30 TABLET | Refills: 3 | Status: SHIPPED | OUTPATIENT
Start: 2022-03-14 | End: 2022-10-03

## 2022-03-14 RX ORDER — AMLODIPINE BESYLATE 10 MG/1
10 TABLET ORAL DAILY
Qty: 90 TABLET | Refills: 0 | Status: SHIPPED | OUTPATIENT
Start: 2022-03-14 | End: 2022-06-06

## 2022-04-08 ENCOUNTER — CLINICAL SUPPORT (OUTPATIENT)
Dept: FAMILY MEDICINE CLINIC | Facility: CLINIC | Age: 57
End: 2022-04-08

## 2022-04-08 ENCOUNTER — TELEPHONE (OUTPATIENT)
Dept: FAMILY MEDICINE CLINIC | Facility: CLINIC | Age: 57
End: 2022-04-08

## 2022-04-08 DIAGNOSIS — E29.1 HYPOGONADISM IN MALE: ICD-10-CM

## 2022-04-08 PROCEDURE — 84402 ASSAY OF FREE TESTOSTERONE: CPT | Performed by: FAMILY MEDICINE

## 2022-04-08 PROCEDURE — 84403 ASSAY OF TOTAL TESTOSTERONE: CPT | Performed by: FAMILY MEDICINE

## 2022-04-08 NOTE — TELEPHONE ENCOUNTER
Pt came in for labs today and said he had a knot/mass on the R side of his neck/throat that's been there for 6 wks. 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 I felt it, it was movable, about the size of a grape, and pt stated it was not painful. I told him I would get the msg to Dr Lita TUCKER, but he might need to go have it evaluated by UC or ER, if he wanted it checked out in time before Monday, being that's it's Friday. He didn't know if you could order a STAT US over the wkd? He said you had no appts today.    Please advise.

## 2022-04-11 ENCOUNTER — OFFICE VISIT (OUTPATIENT)
Dept: FAMILY MEDICINE CLINIC | Facility: CLINIC | Age: 57
End: 2022-04-11

## 2022-04-11 ENCOUNTER — HOSPITAL ENCOUNTER (OUTPATIENT)
Dept: ULTRASOUND IMAGING | Facility: HOSPITAL | Age: 57
Discharge: HOME | End: 2022-04-11

## 2022-04-11 VITALS
OXYGEN SATURATION: 97 % | SYSTOLIC BLOOD PRESSURE: 132 MMHG | DIASTOLIC BLOOD PRESSURE: 80 MMHG | HEART RATE: 87 BPM | BODY MASS INDEX: 32.06 KG/M2 | RESPIRATION RATE: 18 BRPM | WEIGHT: 229 LBS | TEMPERATURE: 98.2 F | HEIGHT: 71 IN

## 2022-04-11 DIAGNOSIS — R22.1 NECK MASS: ICD-10-CM

## 2022-04-11 DIAGNOSIS — R22.1 NECK MASS: Primary | ICD-10-CM

## 2022-04-11 DIAGNOSIS — R73.03 PREDIABETES: ICD-10-CM

## 2022-04-11 DIAGNOSIS — E04.1 THYROID NODULE: ICD-10-CM

## 2022-04-11 PROCEDURE — 83036 HEMOGLOBIN GLYCOSYLATED A1C: CPT | Performed by: FAMILY MEDICINE

## 2022-04-11 PROCEDURE — 99214 OFFICE O/P EST MOD 30 MIN: CPT | Performed by: FAMILY MEDICINE

## 2022-04-11 PROCEDURE — 85025 COMPLETE CBC W/AUTO DIFF WBC: CPT | Performed by: FAMILY MEDICINE

## 2022-04-11 PROCEDURE — 76536 US EXAM OF HEAD AND NECK: CPT

## 2022-04-11 PROCEDURE — 80053 COMPREHEN METABOLIC PANEL: CPT | Performed by: FAMILY MEDICINE

## 2022-04-11 PROCEDURE — 87081 CULTURE SCREEN ONLY: CPT | Performed by: FAMILY MEDICINE

## 2022-04-11 NOTE — PROGRESS NOTES
Subjective   Turner Khoury is a 56 y.o. male.     Chief Complaint   Patient presents with   • Neck Mass     R sided palpable nodule on neck - wants US       History of Present Illness     The following portions of the patient's history were reviewed and updated as appropriate: allergies, current medications, past family history, past medical history, past social history, past surgical history and problem list.    HPI:    The patient presents today for evaluation of a nodule on the right side of his neck. He noticed the nodule approximately 8 weeks ago and would like to complete ultrasound for further evaluation. 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 The patient would like to get this ultrasound done today so we can plan for future treatment planning if needed. He has not had any sore throat, but sometimes feels like he has to clear his throat. He is a non-smoker.    Past Medical History:   Diagnosis Date   • Colon polyp    • Erectile dysfunction    • Hypertension    • Hypogonadism in male    • PSA     2017= 0.7/ 2018= 0.8/ 2019= 0.893/ 2020= 0.732/ 2021=        Past Surgical History:   Procedure Laterality Date   • COLONOSCOPY      2016 TA/ 2021= NEG, rech 2028                   GSI        Family History   Problem Relation Age of Onset   • Alcohol abuse Mother    • Hypertension Mother    • Stroke Mother    • Cancer Father    • No Known Problems Sister        Review of Systems  All systems were reviewed and are negative otherwise what is listed in the HPI.    Objective   Physical Exam  General Appearance: alert, oriented x 3, no acute distress.    Skin: warm and dry.    HEENT: Atraumatic. pupils round and reactive to light and accommodation, oral mucosa pink and moist. Nares patent without epistaxis. External auditory canals are patent tympanic membranes intact.    Neck: supple, no JVD, trachea midline. No thyromegaly    Lungs: CTA, unlabored breathing effort.    Heart:  RRR, normal S1 and S2, no S3, no rub.    Abdomen: soft, non-tender, no palpable bladder, present bowel sounds to auscultation ×4. No guarding or rigidity.    Extremities: no clubbing, cyanosis or edema. Good range of motion actively and passively. Symmetric muscle strength and development    Neuro: normal speech and mental status. Cranial nerves II through XII intact. No anosmia. DTR 2+; proprioception intact. No focal motor/sensory deficits.    Vitals:    04/11/22 0931   BP: 132/80   Pulse: 87   Resp: 18   Temp: 98.2 °F (36.8 °C)   SpO2: 97%     Body mass index is 31.94 kg/m².    Hemoglobin A1C   Date Value Ref Range Status   12/17/2021 5.7 (H) 3.5 - 5.6 % Final   10/29/2020 5.8 (H) 3.5 - 5.6 % Final   11/26/2019 5.7 % Final     LDL Cholesterol    Date Value Ref Range Status   12/17/2021 116 (H) 0 - 100 mg/dL Final   09/02/2020 148 (H) 0 - 100 mg/dL Final   10/29/2019 137 (H) 0 - 100 mg/dL Final     TSH   Date Value Ref Range Status   02/16/2018 1.40 0.40 - 4.50 mIU/L Final   02/22/2017 0.97 0.40 - 4.50 mIU/L Final     Results for orders placed or performed in visit on 12/17/21   PSA Screen    Specimen: Blood   Result Value Ref Range    PSA 1.250 0.000 - 4.000 ng/mL   POC Urinalysis Dipstick, Automated    Specimen: Urine   Result Value Ref Range    Color Yellow Yellow, Straw, Dark Yellow, Rubia    Clarity, UA Clear Clear    Specific Gravity  1.015 1.005 - 1.030    pH, Urine 7.0 5.0 - 8.0    Leukocytes Negative Negative    Nitrite, UA Negative Negative    Protein, POC Negative Negative mg/dL    Glucose, UA Negative Negative, 1000 mg/dL (3+) mg/dL    Ketones, UA Negative Negative    Urobilinogen, UA Normal Normal    Bilirubin Negative Negative    Blood, UA Negative Negative    Lot Number [id number]     Expiration Date 10/31/2023    Hemoglobin A1c    Specimen: Blood   Result Value Ref Range    Hemoglobin A1C 5.7 (H) 3.5 - 5.6 %   Lipid Panel    Specimen: Blood   Result Value Ref Range    Total Cholesterol 179 0 - 200  mg/dL    Triglycerides 70 0 - 150 mg/dL    HDL Cholesterol 50 40 - 60 mg/dL    LDL Cholesterol  116 (H) 0 - 100 mg/dL    VLDL Cholesterol 13 5 - 40 mg/dL    LDL/HDL Ratio 2.30    Comprehensive Metabolic Panel    Specimen: Blood   Result Value Ref Range    Glucose 97 65 - 99 mg/dL    BUN 10 6 - 20 mg/dL    Creatinine 0.91 0.76 - 1.27 mg/dL    Sodium 137 136 - 145 mmol/L    Potassium 4.2 3.5 - 5.2 mmol/L    Chloride 99 98 - 107 mmol/L    CO2 25.6 22.0 - 29.0 mmol/L    Calcium 9.7 8.6 - 10.5 mg/dL    Total Protein 7.8 6.0 - 8.5 g/dL    Albumin 4.70 3.50 - 5.20 g/dL    ALT (SGPT) 30 1 - 41 U/L    AST (SGOT) 26 1 - 40 U/L    Alkaline Phosphatase 82 39 - 117 U/L    Total Bilirubin 0.5 0.0 - 1.2 mg/dL    eGFR Non African Amer 86 >60 mL/min/1.73    Globulin 3.1 gm/dL    A/G Ratio 1.5 g/dL    BUN/Creatinine Ratio 11.0 7.0 - 25.0    Anion Gap 12.4 5.0 - 15.0 mmol/L   Results for orders placed or performed in visit on 10/29/20   PSA Screen    Specimen: Blood   Result Value Ref Range    PSA 0.732 0.000 - 4.000 ng/mL   POC Urinalysis Dipstick, Automated    Specimen: Urine   Result Value Ref Range    Color Yellow Yellow, Straw, Dark Yellow, Rubia    Clarity, UA Clear Clear    Specific Gravity  1.010 1.005 - 1.030    pH, Urine 7.0 5.0 - 8.0    Leukocytes Negative Negative    Nitrite, UA Negative Negative    Protein, POC Negative Negative mg/dL    Glucose, UA Negative Negative, 1000 mg/dL (3+) mg/dL    Ketones, UA Negative Negative    Urobilinogen, UA Normal Normal    Bilirubin Negative Negative    Blood, UA Negative Negative   Hemoglobin A1c    Specimen: Blood   Result Value Ref Range    Hemoglobin A1C 5.8 (H) 3.5 - 5.6 %   Results for orders placed or performed in visit on 09/02/20   Testosterone (Free & Total), LC / MS    Specimen: Blood   Result Value Ref Range    Testosterone, Total 1476.7 (H) 264.0 - 916.0 ng/dL    Testosterone, Free 21.2 7.2 - 24.0 pg/mL   Lipid Panel    Specimen: Blood   Result Value Ref Range    Total  Cholesterol 218 (H) 0 - 200 mg/dL    Triglycerides 73 0 - 150 mg/dL    HDL Cholesterol 55 40 - 60 mg/dL    LDL Cholesterol  148 (H) 0 - 100 mg/dL    VLDL Cholesterol 14.6 5 - 40 mg/dL    LDL/HDL Ratio 2.70    Comprehensive Metabolic Panel    Specimen: Blood   Result Value Ref Range    Glucose 114 (H) 65 - 99 mg/dL    BUN 12 6 - 20 mg/dL    Creatinine 1.13 0.76 - 1.27 mg/dL    Sodium 142 136 - 145 mmol/L    Potassium 3.9 3.5 - 5.2 mmol/L    Chloride 99 98 - 107 mmol/L    CO2 29.7 (H) 22.0 - 29.0 mmol/L    Calcium 9.9 8.6 - 10.5 mg/dL    Total Protein 7.7 6.0 - 8.5 g/dL    Albumin 4.90 3.50 - 5.20 g/dL    ALT (SGPT) 31 1 - 41 U/L    AST (SGOT) 20 1 - 40 U/L    Alkaline Phosphatase 89 39 - 117 U/L    Total Bilirubin 0.5 0.0 - 1.2 mg/dL    eGFR Non African Amer 68 >60 mL/min/1.73    Globulin 2.8 gm/dL    A/G Ratio 1.8 g/dL    BUN/Creatinine Ratio 10.6 7.0 - 25.0    Anion Gap 13.3 5.0 - 15.0 mmol/L   Results for orders placed or performed in visit on 05/14/20   Testosterone (Free & Total), LC / MS    Specimen: Blood   Result Value Ref Range    Testosterone, Total 966.6 (H) 264.0 - 916.0 ng/dL    Testosterone, Free 18.0 7.2 - 24.0 pg/mL   Results for orders placed or performed in visit on 11/26/19   POC Glycosylated Hemoglobin (Hb A1C)    Specimen: Blood   Result Value Ref Range    Hemoglobin A1C 5.7 %   Results for orders placed or performed in visit on 10/29/19   PSA Screen    Specimen: Blood   Result Value Ref Range    PSA 0.893 0.000 - 4.000 ng/mL   Testosterone (Free & Total), LC / MS    Specimen: Blood   Result Value Ref Range    Testosterone, Total 555.1 264.0 - 916.0 ng/dL    Testosterone, Free 8.6 7.2 - 24.0 pg/mL   Lipid Panel    Specimen: Blood   Result Value Ref Range    Total Cholesterol 198 0 - 200 mg/dL    Triglycerides 80 0 - 150 mg/dL    HDL Cholesterol 45 40 - 60 mg/dL    LDL Cholesterol  137 (H) 0 - 100 mg/dL    VLDL Cholesterol 16 5 - 40 mg/dL    LDL/HDL Ratio 3.04    Comprehensive Metabolic Panel     Specimen: Blood   Result Value Ref Range    Glucose 129 (H) 65 - 99 mg/dL    BUN 16 6 - 20 mg/dL    Creatinine 1.04 0.76 - 1.27 mg/dL    Sodium 136 136 - 145 mmol/L    Potassium 4.0 3.5 - 5.2 mmol/L    Chloride 95 (L) 98 - 107 mmol/L    CO2 28.9 22.0 - 29.0 mmol/L    Calcium 9.3 8.6 - 10.5 mg/dL    Total Protein 7.9 6.0 - 8.5 g/dL    Albumin 4.80 3.50 - 5.20 g/dL    ALT (SGPT) 44 (H) 1 - 41 U/L    AST (SGOT) 29 1 - 40 U/L    Alkaline Phosphatase 87 39 - 117 U/L    Total Bilirubin 0.4 0.2 - 1.2 mg/dL    eGFR Non African Amer 74 >60 mL/min/1.73    Globulin 3.1 gm/dL    A/G Ratio 1.5 g/dL    BUN/Creatinine Ratio 15.4 7.0 - 25.0    Anion Gap 12.1 5.0 - 15.0 mmol/L     *Note: Due to a large number of results and/or encounters for the requested time period, some results have not been displayed. A complete set of results can be found in Results Review.       Assessment/Plan   Diagnoses and all orders for this visit:    1. Neck mass (Primary)  -     Cancel: US Head Neck Soft Tissue; Future  -     CBC & Differential  -     Comprehensive metabolic panel; Future  -     Beta Strep Culture, Throat - , Throat; Future  -     Comprehensive metabolic panel  -     Beta Strep Culture, Throat - Swab, Throat    2. Prediabetes  -     Hemoglobin A1c    3. Thyroid nodule  -     TSH+Free T4  -     T3, free; Future      1. Mass of neck.    We will obtain an ultrasound of the right neck for further evaluation. We will also check CBC and CMP. 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    2. Sore throat.    He is a non-smoker. We will obtain a throat culture.    3. Prediabetes.    We will follow up on this and obtain hemoglobin A1c for surveillance.    4. Hypertension.    His blood pressure is stable on his current medications      The patient provided verbal consent to the use of [facility] services during today's visit

## 2022-04-11 NOTE — PROGRESS NOTES
Venipuncture performed in L ARM by RT Jonathan  with good hemostasis. Patient tolerated well. 04/11/22 Mar Butler, RT

## 2022-04-12 LAB
ALBUMIN SERPL-MCNC: 4.6 G/DL (ref 3.5–5.2)
ALBUMIN/GLOB SERPL: 1.9 G/DL
ALP SERPL-CCNC: 86 U/L (ref 39–117)
ALT SERPL W P-5'-P-CCNC: 33 U/L (ref 1–41)
ANION GAP SERPL CALCULATED.3IONS-SCNC: 11.9 MMOL/L (ref 5–15)
AST SERPL-CCNC: 22 U/L (ref 1–40)
BASOPHILS # BLD AUTO: 0.03 10*3/MM3 (ref 0–0.2)
BASOPHILS NFR BLD AUTO: 0.5 % (ref 0–1.5)
BILIRUB SERPL-MCNC: 0.7 MG/DL (ref 0–1.2)
BUN SERPL-MCNC: 12 MG/DL (ref 6–20)
BUN/CREAT SERPL: 11.5 (ref 7–25)
CALCIUM SPEC-SCNC: 9.3 MG/DL (ref 8.6–10.5)
CHLORIDE SERPL-SCNC: 101 MMOL/L (ref 98–107)
CO2 SERPL-SCNC: 27.1 MMOL/L (ref 22–29)
CREAT SERPL-MCNC: 1.04 MG/DL (ref 0.76–1.27)
DEPRECATED RDW RBC AUTO: 42.7 FL (ref 37–54)
EGFRCR SERPLBLD CKD-EPI 2021: 84.3 ML/MIN/1.73
EOSINOPHIL # BLD AUTO: 0.05 10*3/MM3 (ref 0–0.4)
EOSINOPHIL NFR BLD AUTO: 0.9 % (ref 0.3–6.2)
ERYTHROCYTE [DISTWIDTH] IN BLOOD BY AUTOMATED COUNT: 13.3 % (ref 12.3–15.4)
GLOBULIN UR ELPH-MCNC: 2.4 GM/DL
GLUCOSE SERPL-MCNC: 134 MG/DL (ref 65–99)
HBA1C MFR BLD: 5.9 % (ref 3.5–5.6)
HCT VFR BLD AUTO: 50 % (ref 37.5–51)
HGB BLD-MCNC: 16.5 G/DL (ref 13–17.7)
IMM GRANULOCYTES # BLD AUTO: 0.01 10*3/MM3 (ref 0–0.05)
IMM GRANULOCYTES NFR BLD AUTO: 0.2 % (ref 0–0.5)
LYMPHOCYTES # BLD AUTO: 1.47 10*3/MM3 (ref 0.7–3.1)
LYMPHOCYTES NFR BLD AUTO: 26 % (ref 19.6–45.3)
MCH RBC QN AUTO: 29.1 PG (ref 26.6–33)
MCHC RBC AUTO-ENTMCNC: 33 G/DL (ref 31.5–35.7)
MCV RBC AUTO: 88.2 FL (ref 79–97)
MONOCYTES # BLD AUTO: 0.59 10*3/MM3 (ref 0.1–0.9)
MONOCYTES NFR BLD AUTO: 10.4 % (ref 5–12)
NEUTROPHILS NFR BLD AUTO: 3.51 10*3/MM3 (ref 1.7–7)
NEUTROPHILS NFR BLD AUTO: 62 % (ref 42.7–76)
NRBC BLD AUTO-RTO: 0 /100 WBC (ref 0–0.2)
PLATELET # BLD AUTO: 219 10*3/MM3 (ref 140–450)
PMV BLD AUTO: 11.2 FL (ref 6–12)
POTASSIUM SERPL-SCNC: 4.6 MMOL/L (ref 3.5–5.2)
PROT SERPL-MCNC: 7 G/DL (ref 6–8.5)
RBC # BLD AUTO: 5.67 10*6/MM3 (ref 4.14–5.8)
SODIUM SERPL-SCNC: 140 MMOL/L (ref 136–145)
WBC NRBC COR # BLD: 5.66 10*3/MM3 (ref 3.4–10.8)

## 2022-04-13 LAB — BACTERIA SPEC AEROBE CULT: NORMAL

## 2022-04-15 DIAGNOSIS — R22.1 NECK MASS: Primary | ICD-10-CM

## 2022-04-21 LAB
TESTOST FREE SERPL-MCNC: 15.8 PG/ML (ref 7.2–24)
TESTOST SERPL-MCNC: 899.5 NG/DL (ref 264–916)

## 2022-04-21 RX ORDER — SYRINGE WITH NEEDLE, 1 ML 25GX5/8"
1 SYRINGE, EMPTY DISPOSABLE MISCELLANEOUS
Qty: 20 EACH | Refills: 0 | Status: SHIPPED | OUTPATIENT
Start: 2022-04-21

## 2022-04-21 NOTE — TELEPHONE ENCOUNTER
"  Caller: Turner Khoury    Relationship: Self    Best call back number: [phone]    Requested Prescriptions:   Requested Prescriptions     Pending Prescriptions Disp Refills   • Syringe/Needle, Disp, (B-D 3CC LUER-TONI SYR 23GX1\") 23G X 1\" 3 ML misc 20 each 0     Sig: Inject 1 package into the appropriate muscle as directed by prescriber Every 14 (Fourteen) Days.        Pharmacy where request should be sent: [facility] [address] [place] [address] - [phone] [facility] [phone]      Additional details provided by patient: 1 LEFT    Does the patient have less than a 3 day supply:  [] Yes  [] No    Ignacio Aviles Rep   04/21/22 09:54 EDT         "

## 2022-05-11 DIAGNOSIS — E29.1 HYPOTESTOSTERONEMIA IN MALE: ICD-10-CM

## 2022-05-11 RX ORDER — TESTOSTERONE CYPIONATE 200 MG/ML
INJECTION, SOLUTION INTRAMUSCULAR
Qty: 6 ML | Refills: 0 | Status: SHIPPED | OUTPATIENT
Start: 2022-05-11

## 2022-05-11 NOTE — TELEPHONE ENCOUNTER
Rx Refill Note  Requested Prescriptions     Pending Prescriptions Disp Refills   • Testosterone Cypionate (DEPOTESTOTERONE CYPIONATE) 200 MG/ML injection [Pharmacy Med Name: TESTOSTERONE  MG/ML SDV] 2 mL      Sig: INJECT 1 ML INTRAMUSCULARLY EVERY 14 DAYS      Last office visit with prescribing clinician: 12/17/2021      Next office visit with prescribing clinician: 6/6/2022     Testosterone (Free & Total), LC / MS (04/08/2022 08:41)  Hemoglobin A1c (04/11/2022 09:54)  CBC & Differential (04/11/2022 09:54)  Lipid Panel (12/17/2021 16:23)  Comprehensive Metabolic Panel (12/17/2021 16:23)         Anjelica Costello, RANDY  05/11/22, 11:08 EDT

## 2022-06-06 ENCOUNTER — OFFICE VISIT (OUTPATIENT)
Dept: FAMILY MEDICINE CLINIC | Facility: CLINIC | Age: 57
End: 2022-06-06

## 2022-06-06 VITALS
HEART RATE: 79 BPM | HEIGHT: 71 IN | BODY MASS INDEX: 32.34 KG/M2 | TEMPERATURE: 98.2 F | DIASTOLIC BLOOD PRESSURE: 81 MMHG | WEIGHT: 231 LBS | SYSTOLIC BLOOD PRESSURE: 138 MMHG | RESPIRATION RATE: 14 BRPM | OXYGEN SATURATION: 97 %

## 2022-06-06 DIAGNOSIS — E04.1 THYROID NODULE: ICD-10-CM

## 2022-06-06 DIAGNOSIS — M10.9 ACUTE GOUT INVOLVING TOE OF RIGHT FOOT, UNSPECIFIED CAUSE: ICD-10-CM

## 2022-06-06 DIAGNOSIS — E29.1 HYPOGONADISM IN MALE: ICD-10-CM

## 2022-06-06 DIAGNOSIS — I10 PRIMARY HYPERTENSION: ICD-10-CM

## 2022-06-06 DIAGNOSIS — Z00.00 WELLNESS EXAMINATION: Primary | ICD-10-CM

## 2022-06-06 DIAGNOSIS — R73.09 ELEVATED GLUCOSE: ICD-10-CM

## 2022-06-06 DIAGNOSIS — E78.2 MIXED HYPERLIPIDEMIA: ICD-10-CM

## 2022-06-06 LAB — GLUCOSE P FAST SERPL-MCNC: 141 MG/DL (ref 74–106)

## 2022-06-06 PROCEDURE — 99396 PREV VISIT EST AGE 40-64: CPT | Performed by: FAMILY MEDICINE

## 2022-06-06 PROCEDURE — 84439 ASSAY OF FREE THYROXINE: CPT | Performed by: FAMILY MEDICINE

## 2022-06-06 PROCEDURE — 84550 ASSAY OF BLOOD/URIC ACID: CPT | Performed by: FAMILY MEDICINE

## 2022-06-06 PROCEDURE — 80061 LIPID PANEL: CPT | Performed by: FAMILY MEDICINE

## 2022-06-06 PROCEDURE — 36415 COLL VENOUS BLD VENIPUNCTURE: CPT | Performed by: FAMILY MEDICINE

## 2022-06-06 PROCEDURE — 82947 ASSAY GLUCOSE BLOOD QUANT: CPT | Performed by: FAMILY MEDICINE

## 2022-06-06 PROCEDURE — 84443 ASSAY THYROID STIM HORMONE: CPT | Performed by: FAMILY MEDICINE

## 2022-06-06 RX ORDER — AMLODIPINE BESYLATE AND BENAZEPRIL HYDROCHLORIDE 5; 20 MG/1; MG/1
1 CAPSULE ORAL DAILY
Qty: 30 CAPSULE | Refills: 1 | Status: SHIPPED | OUTPATIENT
Start: 2022-06-06 | End: 2022-07-25

## 2022-06-06 RX ORDER — COLCHICINE 0.6 MG/1
TABLET ORAL
Qty: 30 TABLET | Refills: 0 | Status: SHIPPED | OUTPATIENT
Start: 2022-06-06

## 2022-06-06 NOTE — PROGRESS NOTES
Venipuncture performed in right arm by Anjelica Costello CMA  with good hemostasis. Patient tolerated well. 06/06/22 Anjelica Costello CMA

## 2022-06-06 NOTE — PROGRESS NOTES
"[place]   Turner Khoury is a 56 y.o. male.     Chief Complaint   Patient presents with   • Gout     Patient would like to discuss gout.    • Hypertension   • Hypogonadism         Current Outpatient Medications:   •  sildenafil (VIAGRA) 100 MG tablet, Take 1 tablet by mouth As Needed for Erectile Dysfunction., Disp: 30 tablet, Rfl: 3  •  Syringe/Needle, Disp, (B-D 3CC LUER-TONI SYR 23GX1\") 23G X 1\" 3 ML misc, Inject 1 package into the appropriate muscle as directed by prescriber Every 14 (Fourteen) Days., Disp: 20 each, Rfl: 0  •  Testosterone Cypionate (DEPOTESTOTERONE CYPIONATE) 200 MG/ML injection, INJECT 1 ML INTRAMUSCULARLY EVERY 14 DAYS, Disp: 6 mL, Rfl: 0  •  amLODIPine-benazepril (Lotrel) 5-20 MG per capsule, Take 1 capsule by mouth Daily., Disp: 30 capsule, Rfl: 1  •  colchicine 0.6 MG tablet, 2 po x 1 prn gout attack then 1 po 1 hr later x 1 ----do not repeat x 3 days, Disp: 30 tablet, Rfl: 0    Past Medical History:   Diagnosis Date   • Erectile dysfunction    • Hypertension    • Hypogonadism in male    • PSA     = 0.7/ = 0.8/ = 0.893/ = 0.732/ =        Past Surgical History:   Procedure Laterality Date   • COLONOSCOPY      2016= NEG, rech                    GSI        Family History   Problem Relation Age of Onset   • Alcohol abuse Mother    • Hypertension Mother    • Stroke Mother    • Cancer Father    • No Known Problems Sister        Social History     Socioeconomic History   • Marital status: Single   Tobacco Use   • Smoking status: Former Smoker     Packs/day: 2.00     Years: 10.00     Pack years: 20.00     Types: Cigarettes     Start date: 1986     Quit date: 1996     Years since quittin.4   • Smokeless tobacco: Never Used   Vaping Use   • Vaping Use: Never used   Substance and Sexual Activity   • Alcohol use: Yes     Comment: occ   • Drug use: Never   • Sexual activity: Yes     Partners: Female     Birth control/protection: Condom       57 y/o C " male here for annual PE w/ hx/o HTN and recent thyroid US and poss new onset gout    Pt states he is fasting today and needs his biometric paperwork filled out   Pt states he recently had a sudden onset of Rt foot digit #1 w/ swelling/ pain and erythema; pt states it hurt so bad he couldn't put a sheet on at nite; pt bought some tonic water and pushed fluids and took a little IB and now better but wanting this looked into today if poss    Pt saw ENT and they are ok w/ the thyroid US report but still needs the labs done       The following portions of the patient's history were reviewed and updated as appropriate: allergies, current medications, past family history, past medical history, past social history, past surgical history and problem list.    Review of Systems   Constitutional: Negative for activity change, appetite change, fatigue, unexpected weight gain and unexpected weight loss.   HENT: Negative for congestion, ear pain, hearing loss, nosebleeds, postnasal drip, rhinorrhea, sinus pressure, sneezing, sore throat, tinnitus and trouble swallowing.    Eyes: Negative for blurred vision and double vision.   Respiratory: Negative for cough and shortness of breath.    Cardiovascular: Negative for chest pain.   Gastrointestinal: Negative for abdominal pain, constipation, diarrhea, nausea, vomiting, GERD and indigestion.   Genitourinary: Negative for decreased libido, difficulty urinating, dysuria, flank pain, frequency, erectile dysfunction, urgency and urinary incontinence.   Musculoskeletal: Positive for arthralgias, gait problem and joint swelling. Negative for myalgias.   Skin: Negative for rash and skin lesions.   Neurological: Negative for weakness, memory problem and confusion.   Psychiatric/Behavioral: Negative for agitation, self-injury, suicidal ideas, depressed mood and stress. The patient is not nervous/anxious.        Vitals:    06/06/22 0804   BP: 138/81   Pulse: 79   Resp: 14   Temp: 98.2 °F (36.8  °C)   SpO2: 97%       Objective   Physical Exam  Vitals and nursing note reviewed.   Constitutional:       General: He is not in acute distress.     Appearance: Normal appearance. He is well-developed. He is not ill-appearing, toxic-appearing or diaphoretic.   HENT:      Head: Normocephalic and atraumatic.      Right Ear: Tympanic membrane, ear canal and external ear normal. There is no impacted cerumen.      Left Ear: Tympanic membrane, ear canal and external ear normal. There is no impacted cerumen.      Nose: Nose normal. No congestion or rhinorrhea.      Mouth/Throat:      Mouth: Mucous membranes are moist.      Pharynx: Oropharynx is clear. No oropharyngeal exudate or posterior oropharyngeal erythema.   Eyes:      Extraocular Movements: Extraocular movements intact.      Conjunctiva/sclera: Conjunctivae normal.      Pupils: Pupils are equal, round, and reactive to light.   Neck:      Thyroid: No thyromegaly.      Vascular: No carotid bruit.   Cardiovascular:      Rate and Rhythm: Normal rate and regular rhythm.      Heart sounds: Normal heart sounds. No murmur heard.  Pulmonary:      Effort: Pulmonary effort is normal. No respiratory distress.      Breath sounds: Normal breath sounds. No stridor. No wheezing or rales.   Abdominal:      General: Bowel sounds are normal. There is no distension.      Palpations: Abdomen is soft. There is no mass.      Tenderness: There is no abdominal tenderness. There is no guarding or rebound.      Hernia: No hernia is present. There is no hernia in the left inguinal area or right inguinal area.   Genitourinary:     Penis: Circumcised. No erythema or discharge.       Testes:         Right: Mass, tenderness or swelling not present.         Left: Mass, tenderness or swelling not present.   Musculoskeletal:         General: Normal range of motion.      Cervical back: Normal range of motion and neck supple.      Right lower leg: Edema (Tr-1+ pitting edema at b/l feet/ ankles)  present.      Left lower leg: Edema present.   Lymphadenopathy:      Cervical: No cervical adenopathy.      Lower Body: No right inguinal adenopathy. No left inguinal adenopathy.   Skin:     General: Skin is warm and dry.      Capillary Refill: Capillary refill takes less than 2 seconds.      Findings: No erythema or rash.   Neurological:      General: No focal deficit present.      Mental Status: He is alert and oriented to person, place, and time. Mental status is at baseline.      Cranial Nerves: No cranial nerve deficit.      Motor: No abnormal muscle tone.      Deep Tendon Reflexes: Reflexes normal.   Psychiatric:         Attention and Perception: Attention and perception normal.         Mood and Affect: Mood and affect normal.         Speech: Speech normal.         Behavior: Behavior normal. Behavior is cooperative.         Thought Content: Thought content normal.         Cognition and Memory: Cognition and memory normal.         Judgment: Judgment normal.           Assessment & Plan   Diagnoses and all orders for this visit:    1. Wellness examination (Primary)    2. Primary hypertension    3. Acute gout involving toe of right foot, unspecified cause  -     Uric Acid    4. Thyroid nodule  -     TSH  -     T4, Free    5. Mixed hyperlipidemia  -     Lipid Panel    6. Elevated glucose  -     Cancel: POCT Glucose  -     Glucose, Fasting    7. Hypogonadism in male    Other orders  -     amLODIPine-benazepril (Lotrel) 5-20 MG per capsule; Take 1 capsule by mouth Daily.  Dispense: 30 capsule; Refill: 1  -     colchicine 0.6 MG tablet; 2 po x 1 prn gout attack then 1 po 1 hr later x 1 ----do not repeat x 3 days  Dispense: 30 tablet; Refill: 0    decrease Norvasc to 5mg and see if LE swelling improves; add Benazepril 20mg to control BP  Fasting labs/ thyroid and uric acid today  Biometric paperwork filled out  Disc'd gout tx/ prevention w/ pt  Rx---Colchicine prn

## 2022-06-07 LAB
CHOLEST SERPL-MCNC: 189 MG/DL (ref 0–200)
HDLC SERPL-MCNC: 50 MG/DL (ref 40–60)
LDLC SERPL CALC-MCNC: 128 MG/DL (ref 0–100)
LDLC/HDLC SERPL: 2.54 {RATIO}
T4 FREE SERPL-MCNC: 1.46 NG/DL (ref 0.93–1.7)
TRIGL SERPL-MCNC: 61 MG/DL (ref 0–150)
TSH SERPL DL<=0.05 MIU/L-ACNC: 2.29 UIU/ML (ref 0.27–4.2)
URATE SERPL-MCNC: 9.2 MG/DL (ref 3.4–7)
VLDLC SERPL-MCNC: 11 MG/DL (ref 5–40)

## 2022-07-06 ENCOUNTER — CLINICAL SUPPORT (OUTPATIENT)
Dept: FAMILY MEDICINE CLINIC | Facility: CLINIC | Age: 57
End: 2022-07-06

## 2022-07-06 DIAGNOSIS — R73.03 PREDIABETES: Primary | ICD-10-CM

## 2022-07-06 DIAGNOSIS — E29.1 HYPOGONADISM IN MALE: ICD-10-CM

## 2022-07-06 PROCEDURE — 83036 HEMOGLOBIN GLYCOSYLATED A1C: CPT | Performed by: FAMILY MEDICINE

## 2022-07-06 PROCEDURE — 36415 COLL VENOUS BLD VENIPUNCTURE: CPT | Performed by: FAMILY MEDICINE

## 2022-07-06 NOTE — PROGRESS NOTES
Venipuncture performed in L ARM by RT Jonathan  with good hemostasis. Patient tolerated well. 07/06/22 Mar Bulter, RT

## 2022-07-07 LAB — HBA1C MFR BLD: 5.8 % (ref 3.5–5.6)

## 2022-07-25 RX ORDER — AMLODIPINE BESYLATE AND BENAZEPRIL HYDROCHLORIDE 5; 20 MG/1; MG/1
CAPSULE ORAL
Qty: 30 CAPSULE | Refills: 2 | Status: SHIPPED | OUTPATIENT
Start: 2022-07-25 | End: 2022-10-31

## 2022-10-03 RX ORDER — SILDENAFIL 100 MG/1
TABLET, FILM COATED ORAL
Qty: 30 TABLET | Refills: 3 | Status: SHIPPED | OUTPATIENT
Start: 2022-10-03

## 2022-10-31 RX ORDER — AMLODIPINE BESYLATE AND BENAZEPRIL HYDROCHLORIDE 5; 20 MG/1; MG/1
CAPSULE ORAL
Qty: 30 CAPSULE | Refills: 2 | Status: SHIPPED | OUTPATIENT
Start: 2022-10-31 | End: 2023-02-06

## 2022-10-31 NOTE — TELEPHONE ENCOUNTER
Rx Refill Note  Requested Prescriptions     Pending Prescriptions Disp Refills   • amLODIPine-benazepril (LOTREL 5-20) 5-20 MG per capsule [Pharmacy Med Name: amLODIPine-BENAZEPRIL 5-20 MG CAP] 30 capsule 2     Sig: TAKE ONE CAPSULE BY MOUTH DAILY      Last office visit with prescribing clinician: 6/6/2022      Next office visit with prescribing clinician: Visit date not found            Anjelica Costello CMA  10/31/22, 12:27 EDT

## 2023-02-06 RX ORDER — AMLODIPINE BESYLATE AND BENAZEPRIL HYDROCHLORIDE 5; 20 MG/1; MG/1
CAPSULE ORAL
Qty: 30 CAPSULE | Refills: 2 | Status: SHIPPED | OUTPATIENT
Start: 2023-02-06

## 2023-02-06 NOTE — TELEPHONE ENCOUNTER
Caller: Turner Khoury    Relationship: Self    Best call back number: [phone]    Requested Prescriptions:   Requested Prescriptions     Pending Prescriptions Disp Refills   • amLODIPine-benazepril (LOTREL 5-20) 5-20 MG per capsule [Pharmacy Med Name: amLODIPine-BENAZEPRIL 5-20 MG CAP] 30 capsule 2     Sig: TAKE ONE CAPSULE BY MOUTH DAILY        Pharmacy where request should be sent: [facility] [phone] [place] [id number] [facility] - [phone]  - [phone]      Additional details provided by patient:  [place]OWN IN THE MORNING, WILL NEED TODAY 2/6/23  Does the patient have less than a 3 day supply:  [x] Yes  [] No    Would you like a call back once the refill request has been completed: [] Yes [x] No    If the office needs to give you a call back, can they leave a voicemail: [] Yes [x] No    Kathryn Sanches   02/06/23 13:42 EST

## 2023-03-14 DIAGNOSIS — E29.1 HYPOTESTOSTERONEMIA IN MALE: ICD-10-CM

## 2023-03-14 RX ORDER — TESTOSTERONE CYPIONATE 200 MG/ML
INJECTION, SOLUTION INTRAMUSCULAR
Qty: 2 ML | OUTPATIENT
Start: 2023-03-14

## 2023-03-14 NOTE — TELEPHONE ENCOUNTER
Rx Refill Note  Requested Prescriptions     Pending Prescriptions Disp Refills   • Testosterone Cypionate (DEPOTESTOTERONE CYPIONATE) 200 MG/ML injection [Pharmacy Med Name: TESTOSTERONE  MG/ML SDV] 2 mL      Sig: INJECT 1 ML INTRAMUSCULARLY EVERY 14 DAYS      Last office visit with prescribing clinician: 6/6/2022   Last telemedicine visit with prescribing clinician: Visit date not found   Next office visit with prescribing clinician: Visit date not found                       Testosterone (Free & Total), LC / MS (04/08/2022 08:41)    Would you like a call back once the refill request has been completed: [] Yes [] No    If the office needs to give you a call back, can they leave a voicemail: [] Yes [] No    Mar Butler, RT  03/14/23, 12:13 EDT

## 2023-03-14 NOTE — TELEPHONE ENCOUNTER
HUB to share     Refused by: Sisi London DO    Refusal reason: Patient should contact provider first

## 2023-04-28 DIAGNOSIS — R73.09 ELEVATED GLUCOSE: ICD-10-CM

## 2023-04-28 DIAGNOSIS — E78.2 MIXED HYPERLIPIDEMIA: Primary | ICD-10-CM

## 2023-04-28 DIAGNOSIS — M10.9 ACUTE GOUT INVOLVING TOE OF RIGHT FOOT, UNSPECIFIED CAUSE: ICD-10-CM

## 2023-04-28 DIAGNOSIS — Z12.5 SCREENING FOR PROSTATE CANCER: ICD-10-CM

## 2023-04-28 RX ORDER — AMLODIPINE BESYLATE AND BENAZEPRIL HYDROCHLORIDE 5; 20 MG/1; MG/1
CAPSULE ORAL
Qty: 90 CAPSULE | Refills: 0 | Status: SHIPPED | OUTPATIENT
Start: 2023-04-28

## 2023-04-28 NOTE — TELEPHONE ENCOUNTER
HUB TO READ    Needs annual appt with Dr. London on the books per Dr. London.     Left msg for patient to call back to schedule.

## 2023-04-28 NOTE — TELEPHONE ENCOUNTER
Rx Refill Note  Requested Prescriptions     Pending Prescriptions Disp Refills   • amLODIPine-benazepril (LOTREL 5-20) 5-20 MG per capsule [Pharmacy Med Name: amLODIPine-BENAZEPRIL 5-20 MG CAP] 30 capsule 2     Sig: TAKE ONE CAPSULE BY MOUTH DAILY      Last office visit with prescribing clinician: 6/6/2022   Last telemedicine visit with prescribing clinician: Visit date not found   Next office visit with prescribing clinician: Visit date not found                       Lipid Panel (06/06/2022 08:39)    Would you like a call back once the refill request has been completed: [] Yes [] No    If the office needs to give you a call back, can they leave a voicemail: [] Yes [] No    Mar Butler, RT  04/28/23, 10:27 EDT

## 2023-08-23 DIAGNOSIS — E29.1 HYPOTESTOSTERONEMIA IN MALE: ICD-10-CM

## 2023-08-23 RX ORDER — TESTOSTERONE CYPIONATE 200 MG/ML
INJECTION, SOLUTION INTRAMUSCULAR
Qty: 6 ML | Refills: 0 | Status: SHIPPED | OUTPATIENT
Start: 2023-08-23

## 2023-08-23 RX ORDER — AMLODIPINE BESYLATE AND BENAZEPRIL HYDROCHLORIDE 5; 20 MG/1; MG/1
CAPSULE ORAL
Qty: 90 CAPSULE | Refills: 0 | Status: SHIPPED | OUTPATIENT
Start: 2023-08-23

## 2023-08-23 NOTE — TELEPHONE ENCOUNTER
HUB TO READ    Left msg for patient to call back to schedule lab appt for testosterone level soon.  Ok to transfer to office to schedule.

## 2023-08-23 NOTE — TELEPHONE ENCOUNTER
Rx Refill Note  Requested Prescriptions     Pending Prescriptions Disp Refills    amLODIPine-benazepril (LOTREL 5-20) 5-20 MG per capsule [Pharmacy Med Name: amLODIPine-BENAZEPRIL 5-20 MG CAP] 90 capsule 0     Sig: TAKE ONE CAPSULE BY MOUTH DAILY    Testosterone Cypionate (DEPOTESTOTERONE CYPIONATE) 200 MG/ML injection [Pharmacy Med Name: TESTOSTERONE  MG/ML SDV] 2 mL      Sig: INJECT 1 ML INTRAMUSCULARLY EVERY 14 DAYS      Last office visit with prescribing clinician: 7/14/2023   Last telemedicine visit with prescribing clinician: Visit date not found   Next office visit with prescribing clinician: Visit date not found        Lipid Panel (07/14/2023 15:11)                  Would you like a call back once the refill request has been completed: [] Yes [] No    If the office needs to give you a call back, can they leave a voicemail: [] Yes [] No    Mar Butler, RT  08/23/23, 13:14 EDT

## 2023-08-24 ENCOUNTER — TELEPHONE (OUTPATIENT)
Dept: FAMILY MEDICINE CLINIC | Facility: CLINIC | Age: 58
End: 2023-08-24
Payer: COMMERCIAL

## 2023-08-25 ENCOUNTER — CLINICAL SUPPORT (OUTPATIENT)
Dept: FAMILY MEDICINE CLINIC | Facility: CLINIC | Age: 58
End: 2023-08-25
Payer: COMMERCIAL

## 2023-08-25 DIAGNOSIS — E29.1 HYPOGONADISM IN MALE: Primary | ICD-10-CM

## 2023-08-25 PROCEDURE — 36415 COLL VENOUS BLD VENIPUNCTURE: CPT | Performed by: FAMILY MEDICINE

## 2023-08-25 PROCEDURE — 84403 ASSAY OF TOTAL TESTOSTERONE: CPT | Performed by: FAMILY MEDICINE

## 2023-08-25 PROCEDURE — 84402 ASSAY OF FREE TESTOSTERONE: CPT | Performed by: FAMILY MEDICINE

## 2023-08-25 NOTE — PROGRESS NOTES
Venipuncture performed in L ARM by RT Jonathan  with good hemostasis. Patient tolerated well. 08/25/23 Mar Butler, RT     
Patient/Pt doesn't request family notification

## 2023-09-04 LAB
TESTOST FREE SERPL-MCNC: 5.1 PG/ML (ref 7.2–24)
TESTOST SERPL-MCNC: 373.2 NG/DL (ref 264–916)

## 2023-09-25 RX ORDER — AMLODIPINE BESYLATE AND BENAZEPRIL HYDROCHLORIDE 5; 20 MG/1; MG/1
1 CAPSULE ORAL DAILY
Qty: 30 CAPSULE | Refills: 0 | Status: SHIPPED | OUTPATIENT
Start: 2023-09-25

## 2024-01-12 ENCOUNTER — CLINICAL SUPPORT (OUTPATIENT)
Dept: FAMILY MEDICINE CLINIC | Facility: CLINIC | Age: 59
End: 2024-01-12
Payer: COMMERCIAL

## 2024-01-12 DIAGNOSIS — E29.1 HYPOGONADISM IN MALE: ICD-10-CM

## 2024-01-12 DIAGNOSIS — R73.01 FASTING HYPERGLYCEMIA: Primary | ICD-10-CM

## 2024-01-12 DIAGNOSIS — E29.1 HYPOGONADISM IN MALE: Primary | ICD-10-CM

## 2024-01-12 DIAGNOSIS — R73.01 FASTING HYPERGLYCEMIA: ICD-10-CM

## 2024-01-12 PROCEDURE — 83036 HEMOGLOBIN GLYCOSYLATED A1C: CPT | Performed by: FAMILY MEDICINE

## 2024-01-12 PROCEDURE — 36415 COLL VENOUS BLD VENIPUNCTURE: CPT | Performed by: FAMILY MEDICINE

## 2024-01-12 PROCEDURE — 84402 ASSAY OF FREE TESTOSTERONE: CPT | Performed by: FAMILY MEDICINE

## 2024-01-12 PROCEDURE — 84403 ASSAY OF TOTAL TESTOSTERONE: CPT | Performed by: FAMILY MEDICINE

## 2024-01-12 PROCEDURE — 84153 ASSAY OF PSA TOTAL: CPT | Performed by: FAMILY MEDICINE

## 2024-01-12 NOTE — PROGRESS NOTES
Venipuncture performed in L ARM by RT Jonathan  with good hemostasis. Patient tolerated well. 01/12/24 Mar Butler, RT

## 2024-01-13 LAB
HBA1C MFR BLD: 5.9 % (ref 4.8–5.6)
PSA SERPL-MCNC: 0.78 NG/ML (ref 0–4)

## 2024-01-19 ENCOUNTER — PRIOR AUTHORIZATION (OUTPATIENT)
Dept: FAMILY MEDICINE CLINIC | Facility: CLINIC | Age: 59
End: 2024-01-19
Payer: COMMERCIAL

## 2024-01-19 DIAGNOSIS — E29.1 HYPOTESTOSTERONEMIA IN MALE: ICD-10-CM

## 2024-01-19 RX ORDER — SILDENAFIL 100 MG/1
100 TABLET, FILM COATED ORAL AS NEEDED
Qty: 30 TABLET | Refills: 3 | Status: SHIPPED | OUTPATIENT
Start: 2024-01-19

## 2024-01-19 RX ORDER — AMLODIPINE BESYLATE AND BENAZEPRIL HYDROCHLORIDE 5; 20 MG/1; MG/1
1 CAPSULE ORAL DAILY
Qty: 90 CAPSULE | Refills: 0 | Status: SHIPPED | OUTPATIENT
Start: 2024-01-19

## 2024-01-19 RX ORDER — TESTOSTERONE CYPIONATE 200 MG/ML
200 INJECTION, SOLUTION INTRAMUSCULAR
Qty: 6 ML | Refills: 0 | Status: SHIPPED | OUTPATIENT
Start: 2024-01-19

## 2024-01-19 RX ORDER — SYRINGE WITH NEEDLE, 1 ML 25GX5/8"
1 SYRINGE, EMPTY DISPOSABLE MISCELLANEOUS
Qty: 20 EACH | Refills: 0 | Status: SHIPPED | OUTPATIENT
Start: 2024-01-19

## 2024-01-19 NOTE — TELEPHONE ENCOUNTER
"Rx Refill Note  Requested Prescriptions     Pending Prescriptions Disp Refills    amLODIPine-benazepril (LOTREL 5-20) 5-20 MG per capsule 30 capsule 0     Sig: Take 1 capsule by mouth Daily.    Testosterone Cypionate (DEPOTESTOTERONE CYPIONATE) 200 MG/ML injection 6 mL 0     Sig: Inject 1 mL into the appropriate muscle as directed by prescriber Every 14 (Fourteen) Days.    sildenafil (VIAGRA) 100 MG tablet 30 tablet 3     Sig: Take 1 tablet by mouth As Needed for Erectile Dysfunction.    Syringe/Needle, Disp, (B-D 3CC LUER-TONI SYR 23GX1\") 23G X 1\" 3 ML misc 20 each 0     Sig: Inject 1 package into the appropriate muscle as directed by prescriber Every 14 (Fourteen) Days.      Last office visit with prescribing clinician: 7/14/2023   Last telemedicine visit with prescribing clinician: Visit date not found   Next office visit with prescribing clinician: Visit date not found        Testosterone (Free & Total), LC / MS (01/12/2024 09:38)                  Would you like a call back once the refill request has been completed: [] Yes [] No    If the office needs to give you a call back, can they leave a voicemail: [] Yes [] No    Mar Butler, RT  01/19/24, 10:08 EST    "

## 2024-01-19 NOTE — TELEPHONE ENCOUNTER
"    Caller: Turner Khoury \"Ray\"    Relationship: Self    Best call back number: [phone]    Requested Prescriptions:   Requested Prescriptions     Pending Prescriptions Disp Refills    amLODIPine-benazepril (LOTREL 5-20) 5-20 MG per capsule 30 capsule 0     Sig: Take 1 capsule by mouth Daily.    Testosterone Cypionate (DEPOTESTOTERONE CYPIONATE) 200 MG/ML injection 6 mL 0     Sig: Inject 1 mL into the appropriate muscle as directed by prescriber Every 14 (Fourteen) Days.    sildenafil (VIAGRA) 100 MG tablet 30 tablet 3     Sig: Take 1 tablet by mouth As Needed for Erectile Dysfunction.    Syringe/Needle, Disp, (B-D 3CC LUER-TONI SYR 23GX1\") 23G X 1\" 3 ML misc 20 each 0     Sig: Inject 1 package into the appropriate muscle as directed by prescriber Every 14 (Fourteen) Days.        Pharmacy where request should be sent: [facility] #[id number] [address] AT [facility] [address] & [facility] [phone] [facility] [phone] FX     Last office visit with prescribing clinician: 7/14/2023   Last telemedicine visit with prescribing clinician: Visit date not found   Next office visit with prescribing clinician: Visit date not found     Additional details provided by patient: PATIENT HAS ABOUT A WEEK'S SUPPLY OF ALL MEDICINES    Does the patient have less than a 3 day supply:  [] Yes  [] No    Would you like a call back once the refill request has been completed: [] Yes [] No    If the office needs to give you a call back, can they leave a voicemail: [] Yes [] No    Ignacio Acevedo Rep   01/19/24 09:47 EST           "

## 2024-01-19 NOTE — TELEPHONE ENCOUNTER
HUB to read    PA approved for Sildenafil Citrate 100MG tablets    PA approval was faxed to Ann in [place]    PA approved from 12/20/23-1/18/25

## 2024-01-25 LAB
TESTOST FREE SERPL-MCNC: 3.8 PG/ML (ref 7.2–24)
TESTOST SERPL-MCNC: 413.8 NG/DL (ref 264–916)

## 2024-04-29 DIAGNOSIS — E29.1 HYPOTESTOSTERONEMIA IN MALE: ICD-10-CM

## 2024-04-29 NOTE — TELEPHONE ENCOUNTER
"    Caller: Turner Khoury \"Ray\"    Relationship: Self    Best call back number: [phone]     Requested Prescriptions:   Requested Prescriptions     Pending Prescriptions Disp Refills    amLODIPine-benazepril (LOTREL 5-20) 5-20 MG per capsule 90 capsule 0     Sig: Take 1 capsule by mouth Daily.    sildenafil (VIAGRA) 100 MG tablet 30 tablet 3     Sig: Take 1 tablet by mouth As Needed for Erectile Dysfunction.    Testosterone Cypionate (DEPOTESTOTERONE CYPIONATE) 200 MG/ML injection 6 mL 0     Sig: Inject 1 mL into the appropriate muscle as directed by prescriber Every 14 (Fourteen) Days.    Syringe/Needle, Disp, (B-D 3CC LUER-TONI SYR 23GX1\") 23G X 1\" 3 ML misc 20 each 0     Sig: Inject 1 package into the appropriate muscle as directed by prescriber Every 14 (Fourteen) Days.        Pharmacy where request should be sent: [facility] #[id number] [address] AT [facility] [address] & [facility] [phone] [facility] [phone] FX     Last office visit with prescribing clinician: 7/14/2023   Last telemedicine visit with prescribing clinician: Visit date not found   Next office visit with prescribing clinician: Visit date not found     Additional details provided by patient: IN [place]/    Does the patient have less than a 3 day supply:  [] Yes  [x] No    Would you like a call back once the refill request has been completed: [] Yes [x] No    If the office needs to give you a call back, can they leave a voicemail: [] Yes [x] No    Ignacio Brown   04/29/24 12:30 EDT       "

## 2024-04-29 NOTE — TELEPHONE ENCOUNTER
"Rx Refill Note  Requested Prescriptions     Pending Prescriptions Disp Refills    amLODIPine-benazepril (LOTREL 5-20) 5-20 MG per capsule 90 capsule 0     Sig: Take 1 capsule by mouth Daily.    sildenafil (VIAGRA) 100 MG tablet 30 tablet 3     Sig: Take 1 tablet by mouth As Needed for Erectile Dysfunction.    Testosterone Cypionate (DEPOTESTOTERONE CYPIONATE) 200 MG/ML injection 6 mL 0     Sig: Inject 1 mL into the appropriate muscle as directed by prescriber Every 14 (Fourteen) Days.    Syringe/Needle, Disp, (B-D 3CC LUER-TONI SYR 23GX1\") 23G X 1\" 3 ML misc 20 each 0     Sig: Inject 1 package into the appropriate muscle as directed by prescriber Every 14 (Fourteen) Days.      Last office visit with prescribing clinician: 7/14/2023   Last telemedicine visit with prescribing clinician: Visit date not found   Next office visit with prescribing clinician: Visit date not found        Testosterone (Free & Total), LC / MS (01/12/2024 09:38)                  Would you like a call back once the refill request has been completed: [] Yes [] No    If the office needs to give you a call back, can they leave a voicemail: [] Yes [] No    Mar Butler, RT  04/29/24, 12:38 EDT    "

## 2024-05-01 DIAGNOSIS — E79.0 ELEVATED URIC ACID IN BLOOD: ICD-10-CM

## 2024-05-01 DIAGNOSIS — E29.1 HYPOTESTOSTERONEMIA IN MALE: Primary | ICD-10-CM

## 2024-05-01 DIAGNOSIS — Z13.220 ENCOUNTER FOR SCREENING FOR LIPID DISORDER: ICD-10-CM

## 2024-05-01 DIAGNOSIS — R73.01 FASTING HYPERGLYCEMIA: ICD-10-CM

## 2024-05-01 DIAGNOSIS — Z12.5 SPECIAL SCREENING, PROSTATE CANCER: ICD-10-CM

## 2024-05-01 RX ORDER — SILDENAFIL 100 MG/1
100 TABLET, FILM COATED ORAL AS NEEDED
Qty: 30 TABLET | Refills: 3 | Status: SHIPPED | OUTPATIENT
Start: 2024-05-01

## 2024-05-01 RX ORDER — SYRINGE WITH NEEDLE, 1 ML 25GX5/8"
1 SYRINGE, EMPTY DISPOSABLE MISCELLANEOUS
Qty: 20 EACH | Refills: 0 | Status: SHIPPED | OUTPATIENT
Start: 2024-05-01

## 2024-05-01 RX ORDER — AMLODIPINE BESYLATE AND BENAZEPRIL HYDROCHLORIDE 5; 20 MG/1; MG/1
1 CAPSULE ORAL DAILY
Qty: 90 CAPSULE | Refills: 0 | Status: SHIPPED | OUTPATIENT
Start: 2024-05-01

## 2024-05-01 RX ORDER — TESTOSTERONE CYPIONATE 200 MG/ML
200 INJECTION, SOLUTION INTRAMUSCULAR
Qty: 6 ML | Refills: 0 | Status: SHIPPED | OUTPATIENT
Start: 2024-05-01

## 2024-05-01 NOTE — TELEPHONE ENCOUNTER
Patient scheduled for fasting labs and physical on July 24th, patient lives mostly in [place] and will be coming in for this appt.

## 2024-07-24 ENCOUNTER — OFFICE VISIT (OUTPATIENT)
Dept: FAMILY MEDICINE CLINIC | Facility: CLINIC | Age: 59
End: 2024-07-24
Payer: COMMERCIAL

## 2024-07-24 VITALS
DIASTOLIC BLOOD PRESSURE: 80 MMHG | RESPIRATION RATE: 16 BRPM | TEMPERATURE: 97.8 F | OXYGEN SATURATION: 99 % | HEART RATE: 77 BPM | HEIGHT: 71 IN | BODY MASS INDEX: 29.12 KG/M2 | SYSTOLIC BLOOD PRESSURE: 135 MMHG | WEIGHT: 208 LBS

## 2024-07-24 DIAGNOSIS — Z13.220 ENCOUNTER FOR SCREENING FOR LIPID DISORDER: ICD-10-CM

## 2024-07-24 DIAGNOSIS — R73.01 FASTING HYPERGLYCEMIA: ICD-10-CM

## 2024-07-24 DIAGNOSIS — Z00.00 ANNUAL PHYSICAL EXAM: Primary | ICD-10-CM

## 2024-07-24 DIAGNOSIS — E79.0 ELEVATED URIC ACID IN BLOOD: ICD-10-CM

## 2024-07-24 DIAGNOSIS — Z12.5 SPECIAL SCREENING, PROSTATE CANCER: ICD-10-CM

## 2024-07-24 LAB
BILIRUB BLD-MCNC: NEGATIVE MG/DL
CLARITY, POC: CLEAR
COLOR UR: YELLOW
EXPIRATION DATE: NORMAL
GLUCOSE UR STRIP-MCNC: NEGATIVE MG/DL
HBA1C MFR BLD: 6.4 % (ref 4.8–5.6)
KETONES UR QL: NEGATIVE
LEUKOCYTE EST, POC: NEGATIVE
Lab: NORMAL
NITRITE UR-MCNC: NEGATIVE MG/ML
PH UR: 6 [PH] (ref 5–8)
PROT UR STRIP-MCNC: NEGATIVE MG/DL
RBC # UR STRIP: NEGATIVE /UL
SP GR UR: 1.02 (ref 1–1.03)
UROBILINOGEN UR QL: NORMAL

## 2024-07-24 PROCEDURE — 80061 LIPID PANEL: CPT | Performed by: FAMILY MEDICINE

## 2024-07-24 PROCEDURE — 81003 URINALYSIS AUTO W/O SCOPE: CPT | Performed by: FAMILY MEDICINE

## 2024-07-24 PROCEDURE — 36415 COLL VENOUS BLD VENIPUNCTURE: CPT | Performed by: FAMILY MEDICINE

## 2024-07-24 PROCEDURE — 99396 PREV VISIT EST AGE 40-64: CPT | Performed by: FAMILY MEDICINE

## 2024-07-24 PROCEDURE — 82043 UR ALBUMIN QUANTITATIVE: CPT | Performed by: FAMILY MEDICINE

## 2024-07-24 PROCEDURE — 83036 HEMOGLOBIN GLYCOSYLATED A1C: CPT | Performed by: FAMILY MEDICINE

## 2024-07-24 PROCEDURE — 84550 ASSAY OF BLOOD/URIC ACID: CPT | Performed by: FAMILY MEDICINE

## 2024-07-24 PROCEDURE — 80053 COMPREHEN METABOLIC PANEL: CPT | Performed by: FAMILY MEDICINE

## 2024-07-24 PROCEDURE — G0103 PSA SCREENING: HCPCS | Performed by: FAMILY MEDICINE

## 2024-07-24 RX ORDER — MULTIPLE VITAMINS W/ MINERALS TAB 9MG-400MCG
1 TAB ORAL DAILY
COMMUNITY

## 2024-07-24 RX ORDER — AMLODIPINE BESYLATE AND BENAZEPRIL HYDROCHLORIDE 5; 20 MG/1; MG/1
1 CAPSULE ORAL DAILY
Qty: 90 CAPSULE | Refills: 1 | Status: SHIPPED | OUTPATIENT
Start: 2024-07-24

## 2024-07-24 RX ORDER — SYRINGE WITH NEEDLE, 1 ML 25GX5/8"
1 SYRINGE, EMPTY DISPOSABLE MISCELLANEOUS
Qty: 20 EACH | Refills: 0 | Status: SHIPPED | OUTPATIENT
Start: 2024-07-24

## 2024-07-24 RX ORDER — SILDENAFIL 100 MG/1
100 TABLET, FILM COATED ORAL AS NEEDED
Qty: 90 TABLET | Refills: 1 | Status: SHIPPED | OUTPATIENT
Start: 2024-07-24

## 2024-07-24 NOTE — PROGRESS NOTES
Venipuncture performed in right arm by Anjelica Wilkins CMA  with good hemostasis. Patient tolerated well. 07/24/24 Anjelica Wilkins CMA

## 2024-07-24 NOTE — PROGRESS NOTES
"Subjective   Turner Khoury is a 58 y.o. male.     Chief Complaint   Patient presents with    Annual Exam     Physical         Current Outpatient Medications:     amLODIPine-benazepril (LOTREL 5-20) 5-20 MG per capsule, Take 1 capsule by mouth Daily., Disp: 90 capsule, Rfl: 1    Flaxseed, Linseed, (FLAXSEED OIL PO), Take 1 teaspoon(s) by mouth Daily., Disp: , Rfl:     multivitamin with minerals tablet tablet, Take 1 tablet by mouth Daily., Disp: , Rfl:     sildenafil (VIAGRA) 100 MG tablet, Take 1 tablet by mouth As Needed for Erectile Dysfunction., Disp: 90 tablet, Rfl: 1    Syringe/Needle, Disp, (B-D 3CC LUER-TONI SYR 23GX1\") 23G X 1\" 3 ML misc, Inject 1 package into the appropriate muscle as directed by prescriber Every 14 (Fourteen) Days., Disp: 20 each, Rfl: 0    Testosterone Cypionate (DEPOTESTOTERONE CYPIONATE) 200 MG/ML injection, Inject 1 mL into the appropriate muscle as directed by prescriber Every 14 (Fourteen) Days., Disp: 6 mL, Rfl: 0    Past Medical History:   Diagnosis Date    Colon polyp 2017?    Last colonoscopy performed recently had good results.    Erectile dysfunction     Hypertension     Hypogonadism in male     PSA     = 0.7/ = 0.8/ = 0.893/ = 0.732/ = 1.25/ =0.903/ = 0.784       Past Surgical History:   Procedure Laterality Date    COLONOSCOPY      2016= NEG, rech                    GSI        Family History   Problem Relation Age of Onset    Alcohol abuse Mother     Hypertension Mother     Stroke Mother     Cancer Father     No Known Problems Sister        Social History     Socioeconomic History    Marital status: Single   Tobacco Use    Smoking status: Former     Current packs/day: 0.00     Average packs/day: 2.0 packs/day for 10.0 years (19.9 ttl pk-yrs)     Types: Cigarettes     Start date: 1986     Quit date: 1996     Years since quittin.5     Passive exposure: Past    Smokeless tobacco: Never   Vaping Use    Vaping status: Never " Used   Substance and Sexual Activity    Alcohol use: Yes     Alcohol/week: 2.0 standard drinks of alcohol     Types: 2 Cans of beer per week     Comment: occ    Drug use: Never    Sexual activity: Yes     Partners: Female     Birth control/protection: Condom       History of Present Illness  The patient is a 58-year-old  male here for his annual physical.    The patient occasionally monitors his blood pressure at home w/ his cuff, which he believes is accurate. However, it has been some time since his last device was checked for accuracy. His systolic blood pressure typically ranges from 125 to 127.     His current medication regimen includes amlodipine/benazepril 520, Viagra 100, and testosterone 100 mg every 2 weeks. He also takes over-the-counter flaxseed oil, 1 teaspoon once daily, and a multivitamin.     He consumes raw garlic as a holistic approach. His next colonoscopy is due in 2028. He has not received the COVID-19 vaccine. He requires a refill of his 23-gauge syringes. He applies 70 SPF sunblock and consults dermatology annually. He recently underwent a full body scan w/ Dermatology and all was fine.  He maintains regular eye exams and sees Dr. Dinesh Thomas for chiropractic care. He denies experiencing gas, bloating, diarrhea, constipation, or heartburn. He also denies the presence of black, bloody stools, or mucous-like stools. He reports fatigue. He denies any urinary issues.   He quit smoking decades ago. He drinks alcohol 2 times a week. He denies drug use.   He has had the shingles vaccine.        The following portions of the patient's history were reviewed and updated as appropriate: allergies, current medications, past family history, past medical history, past social history, past surgical history and problem list.    Review of Systems   Constitutional:  Positive for fatigue. Negative for activity change, appetite change, unexpected weight gain and unexpected weight loss.   HENT:   "Negative for congestion, ear pain, hearing loss, nosebleeds, postnasal drip, rhinorrhea, sinus pressure, sneezing, sore throat, tinnitus and trouble swallowing.    Eyes:  Negative for blurred vision and double vision.   Respiratory:  Negative for cough and shortness of breath.    Cardiovascular:  Negative for chest pain.   Gastrointestinal:  Negative for abdominal pain, constipation, diarrhea, nausea, vomiting, GERD and indigestion.   Genitourinary:  Negative for difficulty urinating, dysuria, flank pain, frequency, urgency and urinary incontinence.   Musculoskeletal:  Negative for arthralgias and myalgias.   Skin:  Negative for rash and skin lesions.   Neurological:  Negative for weakness, memory problem and confusion.   Psychiatric/Behavioral:  Negative for agitation, self-injury, suicidal ideas, depressed mood and stress. The patient is not nervous/anxious.        Vitals:    07/24/24 0855   BP: 135/80   Pulse: 77   Resp: 16   Temp: 97.8 °F (36.6 °C)   SpO2: 99%       Objective   Physical Exam  Physical Exam  Vital Signs  Vitals show a blood pressure of 135/80.     BMI is >= 25 and <30. (Overweight) The following options were offered after discussion;: exercise counseling/recommendations and nutrition counseling/recommendations      Assessment & Plan   Diagnoses and all orders for this visit:    1. Annual physical exam (Primary)  -     POC Urinalysis Dipstick, Automated    2. Elevated uric acid in blood  -     Uric Acid    3. Fasting hyperglycemia  -     Comprehensive Metabolic Panel  -     Hemoglobin A1c  -     MicroAlbumin, Urine, Random - Urine, Clean Catch    4. Encounter for screening for lipid disorder  -     Lipid Panel    5. Special screening, prostate cancer  -     PSA Screen    Other orders  -     Syringe/Needle, Disp, (B-D 3CC LUER-TONI SYR 23GX1\") 23G X 1\" 3 ML misc; Inject 1 package into the appropriate muscle as directed by prescriber Every 14 (Fourteen) Days.  Dispense: 20 each; Refill: 0  -     " amLODIPine-benazepril (LOTREL 5-20) 5-20 MG per capsule; Take 1 capsule by mouth Daily.  Dispense: 90 capsule; Refill: 1  -     sildenafil (VIAGRA) 100 MG tablet; Take 1 tablet by mouth As Needed for Erectile Dysfunction.  Dispense: 90 tablet; Refill: 1      Assessment & Plan  1. Annual physical.  A prescription for 23-gauge syringes with a 1-inch, 20-gauge, will be sent to [facility] in [place] [facility] A 3-month refill of Viagra will be sent to [facility]. Testosterone levels will be checked prior to the next injection. A referral to Dr. Wong will be made.     Results            Patient or patient representative verbalized consent for the use of Ambient Listening during the visit with  Sisi London DO for chart documentation. 7/27/2024  16:18 EDT

## 2024-07-25 LAB
ALBUMIN SERPL-MCNC: 4.4 G/DL (ref 3.5–5.2)
ALBUMIN UR-MCNC: <1.2 MG/DL
ALBUMIN/GLOB SERPL: 1.7 G/DL
ALP SERPL-CCNC: 71 U/L (ref 39–117)
ALT SERPL W P-5'-P-CCNC: 29 U/L (ref 1–41)
ANION GAP SERPL CALCULATED.3IONS-SCNC: 10 MMOL/L (ref 5–15)
AST SERPL-CCNC: 20 U/L (ref 1–40)
BILIRUB SERPL-MCNC: 0.5 MG/DL (ref 0–1.2)
BUN SERPL-MCNC: 12 MG/DL (ref 6–20)
BUN/CREAT SERPL: 11.2 (ref 7–25)
CALCIUM SPEC-SCNC: 9.1 MG/DL (ref 8.6–10.5)
CHLORIDE SERPL-SCNC: 101 MMOL/L (ref 98–107)
CHOLEST SERPL-MCNC: 171 MG/DL (ref 0–200)
CO2 SERPL-SCNC: 26 MMOL/L (ref 22–29)
CREAT SERPL-MCNC: 1.07 MG/DL (ref 0.76–1.27)
EGFRCR SERPLBLD CKD-EPI 2021: 80.4 ML/MIN/1.73
GLOBULIN UR ELPH-MCNC: 2.6 GM/DL
GLUCOSE SERPL-MCNC: 140 MG/DL (ref 65–99)
HDLC SERPL-MCNC: 57 MG/DL (ref 40–60)
LDLC SERPL CALC-MCNC: 104 MG/DL (ref 0–100)
LDLC/HDLC SERPL: 1.82 {RATIO}
POTASSIUM SERPL-SCNC: 4.7 MMOL/L (ref 3.5–5.2)
PROT SERPL-MCNC: 7 G/DL (ref 6–8.5)
PSA SERPL-MCNC: 0.89 NG/ML (ref 0–4)
SODIUM SERPL-SCNC: 137 MMOL/L (ref 136–145)
TRIGL SERPL-MCNC: 50 MG/DL (ref 0–150)
URATE SERPL-MCNC: 6.9 MG/DL (ref 3.4–7)
VLDLC SERPL-MCNC: 10 MG/DL (ref 5–40)

## 2024-07-27 DIAGNOSIS — E29.1 HYPOTESTOSTERONEMIA IN MALE: ICD-10-CM

## 2024-07-29 NOTE — TELEPHONE ENCOUNTER
Rx Refill Note  Requested Prescriptions     Pending Prescriptions Disp Refills    Testosterone Cypionate (DEPOTESTOTERONE CYPIONATE) 200 MG/ML injection [Pharmacy Med Name: TESTOSTERONE CYP 200MG/ML SDV 1ML] 6 mL      Sig: ADMINISTER 1 ML IN THE MUSCLE EVERY 14 DAYS AS DIRECTED BY PRESCRIBER      Last office visit with prescribing clinician: 7/24/2024   Last telemedicine visit with prescribing clinician: Visit date not found   Next office visit with prescribing clinician: Visit date not found        Testosterone (Free & Total), LC / MS (01/12/2024 09:38)                  Would you like a call back once the refill request has been completed: [] Yes [] No    If the office needs to give you a call back, can they leave a voicemail: [] Yes [] No    Mar Butler, RT  07/29/24, 11:51 EDT

## 2024-07-30 RX ORDER — TESTOSTERONE CYPIONATE 200 MG/ML
INJECTION, SOLUTION INTRAMUSCULAR
Qty: 6 ML | Refills: 0 | Status: SHIPPED | OUTPATIENT
Start: 2024-07-30

## 2024-08-21 ENCOUNTER — CLINICAL SUPPORT (OUTPATIENT)
Dept: FAMILY MEDICINE CLINIC | Facility: CLINIC | Age: 59
End: 2024-08-21
Payer: COMMERCIAL

## 2024-08-21 DIAGNOSIS — E29.1 HYPOTESTOSTERONEMIA IN MALE: ICD-10-CM

## 2024-08-21 PROCEDURE — 84402 ASSAY OF FREE TESTOSTERONE: CPT | Performed by: FAMILY MEDICINE

## 2024-08-21 PROCEDURE — 36415 COLL VENOUS BLD VENIPUNCTURE: CPT | Performed by: FAMILY MEDICINE

## 2024-08-21 PROCEDURE — 84403 ASSAY OF TOTAL TESTOSTERONE: CPT | Performed by: FAMILY MEDICINE

## 2024-08-21 NOTE — PROGRESS NOTES
Venipuncture performed in L ARM by RT Jonathan  with good hemostasis. Patient tolerated well. 08/21/24 Mar Butler, RT

## 2024-08-27 LAB
TESTOST FREE SERPL-MCNC: 3.8 PG/ML (ref 7.2–24)
TESTOST SERPL-MCNC: 267.8 NG/DL (ref 264–916)

## 2024-08-28 ENCOUNTER — PATIENT MESSAGE (OUTPATIENT)
Dept: FAMILY MEDICINE CLINIC | Facility: CLINIC | Age: 59
End: 2024-08-28
Payer: COMMERCIAL

## 2024-09-04 ENCOUNTER — TELEPHONE (OUTPATIENT)
Dept: FAMILY MEDICINE CLINIC | Facility: CLINIC | Age: 59
End: 2024-09-04
Payer: COMMERCIAL

## 2024-09-04 NOTE — TELEPHONE ENCOUNTER
RELAY     Sisi London DO  8/27/2024  8:39 PM EDT        Testosterone levels low--------- is current dose working for him? 100mg every 14 days?        LVM informing pt  Shelby msg also sent

## 2024-09-05 ENCOUNTER — TELEPHONE (OUTPATIENT)
Dept: FAMILY MEDICINE CLINIC | Facility: CLINIC | Age: 59
End: 2024-09-05

## 2024-09-05 DIAGNOSIS — E29.1 HYPOTESTOSTERONEMIA IN MALE: ICD-10-CM

## 2024-09-05 RX ORDER — TESTOSTERONE CYPIONATE 200 MG/ML
100 INJECTION, SOLUTION INTRAMUSCULAR
Qty: 6 ML | Refills: 1 | Status: SHIPPED | OUTPATIENT
Start: 2024-09-05

## 2024-09-05 NOTE — TELEPHONE ENCOUNTER
"  Caller: Turner Khoury \"Ray\"    Relationship: Self    Best call back number: [phone]       Who are you requesting to speak with (clinical staff, provider,  specific staff member): DR HORVATH    What was the call regarding: PATIENT WOULD LIKE TO KEEP CURRENT DOSAGE    "

## 2024-09-05 NOTE — TELEPHONE ENCOUNTER
"Sunitha Mandel RegSched Rep2 minutes ago (10:07 AM)     EM     Caller: Turner Khoury \"Ray\"     Relationship: Self     Best call back number: [phone]         Who are you requesting to speak with (clinical staff, provider,  specific staff member): DR HORVATH     What was the call regarding: PATIENT WOULD LIKE TO KEEP CURRENT DOSAGE        "

## 2024-10-16 RX ORDER — SILDENAFIL 100 MG/1
100 TABLET, FILM COATED ORAL AS NEEDED
Qty: 90 TABLET | Refills: 1 | Status: SHIPPED | OUTPATIENT
Start: 2024-10-16

## 2024-12-09 RX ORDER — AMLODIPINE AND BENAZEPRIL HYDROCHLORIDE 5; 20 MG/1; MG/1
1 CAPSULE ORAL DAILY
Qty: 90 CAPSULE | Refills: 1 | Status: SHIPPED | OUTPATIENT
Start: 2024-12-09

## 2024-12-09 RX ORDER — SILDENAFIL 100 MG/1
100 TABLET, FILM COATED ORAL AS NEEDED
Qty: 90 TABLET | Refills: 1 | Status: SHIPPED | OUTPATIENT
Start: 2024-12-09

## 2025-03-06 ENCOUNTER — PRIOR AUTHORIZATION (OUTPATIENT)
Dept: FAMILY MEDICINE CLINIC | Facility: CLINIC | Age: 60
End: 2025-03-06
Payer: COMMERCIAL

## 2025-03-06 RX ORDER — SILDENAFIL 100 MG/1
100 TABLET, FILM COATED ORAL AS NEEDED
Qty: 90 TABLET | Refills: 1 | Status: SHIPPED | OUTPATIENT
Start: 2025-03-06

## 2025-03-06 NOTE — TELEPHONE ENCOUNTER
HUB to read    PA approved for Sildenafil Citrate 100MG tablets    PA approval was faxed to Ann Santiago  Approved today by [employer] 2017  CaseId:[id number];Status:Approved;Review Type:Prior Auth;Coverage Start Date:02/04/2025;Coverage End Date:03/06/2026;;CaseId:[id number];Status:Denied;Review Type:Qty;Appeal Information:;  Effective Date: 2/3/2025  Authorization Expiration Date: 3/5/2026

## 2025-03-06 NOTE — TELEPHONE ENCOUNTER
HUB to read    PA was sent for Sildenafil Citrate 100MG tablets    Waiting on the outcome from insurance.

## 2025-07-11 DIAGNOSIS — E29.1 HYPOTESTOSTERONEMIA IN MALE: Primary | ICD-10-CM

## 2025-07-11 DIAGNOSIS — E29.1 HYPOGONADISM IN MALE: ICD-10-CM

## 2025-07-11 DIAGNOSIS — E78.2 MIXED HYPERLIPIDEMIA: ICD-10-CM

## 2025-07-11 DIAGNOSIS — Z12.5 SCREENING FOR PROSTATE CANCER: ICD-10-CM

## 2025-07-11 DIAGNOSIS — M10.00 IDIOPATHIC GOUT, UNSPECIFIED CHRONICITY, UNSPECIFIED SITE: ICD-10-CM

## 2025-07-11 DIAGNOSIS — E29.1 HYPOTESTOSTERONEMIA IN MALE: ICD-10-CM

## 2025-07-11 DIAGNOSIS — E79.0 ELEVATED URIC ACID IN BLOOD: ICD-10-CM

## 2025-07-11 DIAGNOSIS — R73.02 IGT (IMPAIRED GLUCOSE TOLERANCE): ICD-10-CM

## 2025-07-11 RX ORDER — SILDENAFIL 100 MG/1
100 TABLET, FILM COATED ORAL AS NEEDED
Qty: 90 TABLET | Refills: 1 | OUTPATIENT
Start: 2025-07-11

## 2025-07-11 RX ORDER — TESTOSTERONE CYPIONATE 200 MG/ML
100 INJECTION, SOLUTION INTRAMUSCULAR
Qty: 6 ML | Refills: 1 | OUTPATIENT
Start: 2025-07-11

## 2025-07-11 NOTE — TELEPHONE ENCOUNTER
Rx Refill Note  Requested Prescriptions     Pending Prescriptions Disp Refills    Testosterone Cypionate (DEPOTESTOTERONE CYPIONATE) 200 MG/ML injection 6 mL 1     Sig: Inject 0.5 mL into the appropriate muscle as directed by prescriber Every 14 (Fourteen) Days.    sildenafil (VIAGRA) 100 MG tablet 90 tablet 1     Sig: Take 1 tablet by mouth As Needed for Erectile Dysfunction.      Last office visit with prescribing clinician: 7/24/2024   Last telemedicine visit with prescribing clinician: Visit date not found   Next office visit with prescribing clinician: Visit date not found        Lipid Panel (07/24/2024 09:25)                  Would you like a call back once the refill request has been completed: [] Yes [] No    If the office needs to give you a call back, can they leave a voicemail: [] Yes [] No    Mar Butler, RT  07/11/25, 10:36 EDT

## 2025-07-14 NOTE — TELEPHONE ENCOUNTER
RELAY    Patient needs an appointment WITH DR. HORVATH PER DR. HORVATH      LEFT MSG FOR PATIENT TO CALL BACK TO SCHEDULE

## 2025-08-09 RX ORDER — AMLODIPINE AND BENAZEPRIL HYDROCHLORIDE 5; 20 MG/1; MG/1
1 CAPSULE ORAL DAILY
Qty: 90 CAPSULE | Refills: 0 | Status: SHIPPED | OUTPATIENT
Start: 2025-08-09